# Patient Record
Sex: MALE | Race: WHITE | HISPANIC OR LATINO | Employment: FULL TIME | URBAN - METROPOLITAN AREA
[De-identification: names, ages, dates, MRNs, and addresses within clinical notes are randomized per-mention and may not be internally consistent; named-entity substitution may affect disease eponyms.]

---

## 2018-01-30 ENCOUNTER — OFFICE VISIT (OUTPATIENT)
Dept: FAMILY MEDICINE | Facility: CLINIC | Age: 33
End: 2018-01-30
Payer: COMMERCIAL

## 2018-01-30 VITALS
DIASTOLIC BLOOD PRESSURE: 82 MMHG | WEIGHT: 210.6 LBS | HEART RATE: 92 BPM | SYSTOLIC BLOOD PRESSURE: 131 MMHG | TEMPERATURE: 97.5 F | OXYGEN SATURATION: 98 % | HEIGHT: 68 IN | BODY MASS INDEX: 31.92 KG/M2 | RESPIRATION RATE: 16 BRPM

## 2018-01-30 DIAGNOSIS — G47.30 SLEEP APNEA, UNSPECIFIED TYPE: ICD-10-CM

## 2018-01-30 DIAGNOSIS — Z00.00 ROUTINE HISTORY AND PHYSICAL EXAMINATION OF ADULT: Primary | ICD-10-CM

## 2018-01-30 DIAGNOSIS — Z13.6 CARDIOVASCULAR SCREENING; LDL GOAL LESS THAN 160: ICD-10-CM

## 2018-01-30 DIAGNOSIS — Z13.1 SCREENING FOR DIABETES MELLITUS: ICD-10-CM

## 2018-01-30 LAB
CHOLEST SERPL-MCNC: 183 MG/DL
GLUCOSE SERPL-MCNC: 100 MG/DL (ref 70–99)
HDLC SERPL-MCNC: 45 MG/DL
LDLC SERPL CALC-MCNC: 96 MG/DL
NONHDLC SERPL-MCNC: 138 MG/DL
TRIGL SERPL-MCNC: 208 MG/DL

## 2018-01-30 PROCEDURE — 82947 ASSAY GLUCOSE BLOOD QUANT: CPT | Performed by: FAMILY MEDICINE

## 2018-01-30 PROCEDURE — 36415 COLL VENOUS BLD VENIPUNCTURE: CPT | Performed by: FAMILY MEDICINE

## 2018-01-30 PROCEDURE — 99385 PREV VISIT NEW AGE 18-39: CPT | Performed by: FAMILY MEDICINE

## 2018-01-30 PROCEDURE — 80061 LIPID PANEL: CPT | Performed by: FAMILY MEDICINE

## 2018-01-30 ASSESSMENT — PAIN SCALES - GENERAL: PAINLEVEL: MODERATE PAIN (5)

## 2018-01-30 NOTE — LETTER
2018      Zoran Zack  [address]  [place] [facility] [address]              Dear Zoran Dixon        Your blood sugar and cholesterol tests were okay for you. Your triglycerides mildly elevated. Low fat diet and regular exercise can help lower this. Please follow up in 1 year for routine physical.     Enclosed is a copy of the results.  It was a pleasure to see you at your last appointment.    If you have any questions or concerns, please call myself or my nurse at [phone].      Sincerely,      Pradip Mclean MD/TAMIKA Gomez MA  [employer]      Results for orders placed or performed in visit on 18   Lipid panel reflex to direct LDL Fasting   Result Value Ref Range    Cholesterol 183 <200 mg/dL    Triglycerides 208 (H) <150 mg/dL    HDL Cholesterol 45 >39 mg/dL    LDL Cholesterol Calculated 96 <100 mg/dL    Non HDL Cholesterol 138 (H) <130 mg/dL   Glucose   Result Value Ref Range    Glucose 100 (H) 70 - 99 mg/dL                 RE: Zoran Dixon   MRN: [id number]  : 1985  ENC DATE: 2018

## 2018-01-30 NOTE — MR AVS SNAPSHOT
After Visit Summary   1/30/2018    Zoran Dixon    MRN: [id number]           Patient Information     Date Of Birth          1985        Visit Information        Provider Department      1/30/2018 7:45 AM Pradip Mclean MD; [employer] [facility]        Today's Diagnoses     Routine history and physical examination of adult    -  1    CARDIOVASCULAR SCREENING; LDL GOAL LESS THAN 160        Screening for diabetes mellitus        Sleep apnea, unspecified type          Care Instructions      Preventive Health Recommendations  Male Ages 26 - 39    Yearly exam:             See your health care provider every year in order to  o   Review health changes.   o   Discuss preventive care.    o   Review your medicines if your doctor has prescribed any.    You should be tested each year for STDs (sexually transmitted diseases), if you re at risk.     After age 35, talk to your provider about cholesterol testing. If you are at risk for heart disease, have your cholesterol tested at least every 5 years.     If you are at risk for diabetes, you should have a diabetes test (fasting glucose).  Shots: Get a flu shot each year. Get a tetanus shot every 10 years.     Nutrition:    Eat at least 5 servings of fruits and vegetables daily.     Eat whole-grain bread, whole-wheat pasta and brown rice instead of white grains and rice.     Talk to your provider about Calcium and Vitamin D.     Lifestyle    Exercise for at least 150 minutes a week (30 minutes a day, 5 days a week). This will help you control your weight and prevent disease.     Limit alcohol to one drink per day.     No smoking.     Wear sunscreen to prevent skin cancer.     See your dentist every six months for an exam and cleaning.             Follow-ups after your visit        Additional Services     SLEEP EVALUATION & MANAGEMENT REFERRAL - ADULT -[facility]  [phone] (Age 15 and up)        "Please be aware that coverage of these services is subject to the terms and limitations of your health insurance plan.  Call member services at your health plan with any benefit or coverage questions.      Please bring the following to your appointment:    >>   List of current medications   >>   This referral request   >>   Any documents/labs given to you for this referral                      Future tests that were ordered for you today     Open Future Orders        Priority Expected Expires Ordered    SLEEP EVALUATION & MANAGEMENT REFERRAL - ADULT -[facility] [place]  [phone] (Age 15 and up) Routine  1/30/2019 1/30/2018            Who to contact     If you have questions or need follow up information about today's clinic visit or your schedule please contact [facility] directly at [phone].  Normal or non-critical lab and imaging results will be communicated to you by MyChart, letter or phone within 4 business days after the clinic has received the results. If you do not hear from us within 7 days, please contact the clinic through MyChart or phone. If you have a critical or abnormal lab result, we will notify you by phone as soon as possible.  Submit refill requests through [employer] or call your pharmacy and they will forward the refill request to us. Please allow 3 business days for your refill to be completed.          Additional Information About Your Visit        [employer] Information     [employer] lets you send messages to your doctor, view your test results, renew your prescriptions, schedule appointments and more. To sign up, go to [email or web address] . Click on \"Log in\" on the left side of the screen, which will take you to the Welcome page. Then click on \"Sign up Now\" on the right side of the page.     You will be asked to enter the access code listed below, as well as some personal information. Please follow the directions to create your username and " "password.     Your access code is: [email or web address]  Expires: 2018  8:20 AM     Your access code will  in 90 days. If you need help or a new code, please call your [facility] clinic or [phone].        Care EveryWhere ID     This is your Care EveryWhere ID. This could be used by other organizations to access your [facility] medical records  [id number]        Your Vitals Were     Pulse Temperature Respirations Height Pulse Oximetry BMI (Body Mass Index)    92 97.5  F (36.4  C) (Oral) 16 5' 8\" (1.727 m) 98% 32.02 kg/m2       Blood Pressure from Last 3 Encounters:   18 131/82    Weight from Last 3 Encounters:   18 210 lb 9.6 oz (95.5 kg)              We Performed the Following     Glucose     Lipid panel reflex to direct LDL Fasting        Primary Care Provider Fax #    Provider Not In System [phone]                Equal Access to Services     VINAY STRAUSS : Hadii alicia ku hadasho Soomaali, waaxda luqadaha, qaybta kaalmada adeegyada, jeannie gregg . So [facility] [phone].    ATENCIÓN: Si habla español, tiene a prado disposición servicios gratuitos de asistencia lingüística. Llame al [phone].    We comply with applicable federal civil rights laws and [place] laws. We do not discriminate on the basis of race, color, national origin, age, disability, sex, sexual orientation, or gender identity.            Thank you!     Thank you for choosing [facility]  for your care. Our goal is always to provide you with excellent care. Hearing back from our patients is one way we can continue to improve our services. Please take a few minutes to complete the written survey that you may receive in the mail after your visit with us. Thank you!             Your Updated Medication List - Protect others around you: Learn how to safely use, store and throw away your medicines at www.disposemymeds.org.      Notice  As of 2018  8:20 AM    You have not been " prescribed any medications.

## 2018-01-30 NOTE — PROGRESS NOTES
SUBJECTIVE:   CC: Zoran Dixon is an 32 year old male who presents for preventative health visit.     Healthy Habits:    Do you get at least three servings of calcium containing foods daily (dairy, green leafy vegetables, etc.)? yes    Amount of exercise or daily activities, outside of work: None    Problems taking medications regularly No    Medication side effects: No    Have you had an eye exam in the past two years? no    Do you see a dentist twice per year? no    Do you have sleep apnea, excessive snoring or daytime drowsiness?yes - pt did have a sleep study before -- wanting another one       Today's PHQ-2 Score:   PHQ-2 ( 1999 [employer]) 1/30/2018   Q1: Little interest or pleasure in doing things 0   Q2: Feeling down, depressed or hopeless 0   PHQ-2 Score 0       Abuse: Current or Past(Physical, Sexual or Emotional)- No  Do you feel safe in your environment - Yes    Social History   Substance Use Topics     Smoking status: Never Smoker     Smokeless tobacco: Never Used     Alcohol use Yes      If you drink alcohol do you typically have >3 drinks per day or >7 drinks per week? No                      Last PSA: No results found for: PSA    Reviewed orders with patient. Reviewed health maintenance and updated orders accordingly - Yes  Labs reviewed in EPIC    Reviewed and updated as needed this visit by clinical staff  Tobacco  Allergies  Meds  Med Hx  Surg Hx  Fam Hx  Soc Hx        Reviewed and updated as needed this visit by Provider            ROS:  C: NEGATIVE for fever, chills, change in weight  I: NEGATIVE for worrisome rashes, moles or lesions  E: NEGATIVE for vision changes or irritation  ENT: NEGATIVE for ear, mouth and throat problems  R: NEGATIVE for significant cough or SOB  CV: NEGATIVE for chest pain, palpitations or peripheral edema  GI: NEGATIVE for nausea, abdominal pain, heartburn, or change in bowel habits   male: negative for dysuria, hematuria, decreased urinary stream, erectile  "dysfunction, urethral discharge  M: NEGATIVE for significant arthralgias or myalgia  N: NEGATIVE for weakness, dizziness or paresthesias  P: NEGATIVE for changes in mood or affect    OBJECTIVE:   /82 (BP Location: Left arm, Patient Position: Sitting, Cuff Size: Adult Regular)  Pulse 92  Temp 97.5  F (36.4  C) (Oral)  Resp 16  Ht 5' 8\" (1.727 m)  Wt 210 lb 9.6 oz (95.5 kg)  SpO2 98%  BMI 32.02 kg/m2  EXAM:  GENERAL: healthy, alert and no distress  NECK: no adenopathy, no asymmetry, masses, or scars and thyroid normal to palpation  RESP: lungs clear to auscultation - no rales, rhonchi or wheezes  CV: regular rate and rhythm, normal S1 S2, no S3 or S4, no murmur, click or rub, no peripheral edema and peripheral pulses strong  ABDOMEN: soft, nontender, no hepatosplenomegaly, no masses and bowel sounds normal  MS: no gross musculoskeletal defects noted, no edema    ASSESSMENT/PLAN:   1. Routine history and physical examination of adult  As below.    2. CARDIOVASCULAR SCREENING; LDL GOAL LESS THAN 160    - Lipid panel reflex to direct LDL Fasting    3. Screening for diabetes mellitus  - Glucose    4. Sleep apnea, unspecified type  Discussed weight loss.  - SLEEP EVALUATION & MANAGEMENT REFERRAL - ADULT -[facility] [place]  [phone] (Age 15 and up); Future    COUNSELING:  Reviewed preventive health counseling, as reflected in patient instructions       Regular exercise       Healthy diet/nutrition       Vision screening       reports that he has never smoked. He has never used smokeless tobacco.    Estimated body mass index is 32.02 kg/(m^2) as calculated from the following:    Height as of this encounter: 5' 8\" (1.727 m).    Weight as of this encounter: 210 lb 9.6 oz (95.5 kg).       Counseling Resources:  ATP IV Guidelines  Pooled Cohorts Equation Calculator  FRAX Risk Assessment  ICSI Preventive Guidelines  Dietary Guidelines for Americans, 2010  USDA's MyPlate  ASA Prophylaxis  Lung " CA Screening    Pradip Mclean MD, MD  [facility]

## 2018-01-30 NOTE — NURSING NOTE
"Chief Complaint   Patient presents with     Physical     pt is fasting       Initial /82 (BP Location: Left arm, Patient Position: Sitting, Cuff Size: Adult Regular)  Pulse 92  Temp 97.5  F (36.4  C) (Oral)  Resp 16  Ht 5' 8\" (1.727 m)  Wt 210 lb 9.6 oz (95.5 kg)  SpO2 98%  BMI 32.02 kg/m2 Estimated body mass index is 32.02 kg/(m^2) as calculated from the following:    Height as of this encounter: 5' 8\" (1.727 m).    Weight as of this encounter: 210 lb 9.6 oz (95.5 kg).  Medication Reconciliation: complete     Will Hunter LAND      "

## 2018-02-07 ENCOUNTER — OFFICE VISIT (OUTPATIENT)
Dept: SLEEP MEDICINE | Facility: CLINIC | Age: 33
End: 2018-02-07
Attending: FAMILY MEDICINE
Payer: COMMERCIAL

## 2018-02-07 VITALS — BODY MASS INDEX: 32.96 KG/M2 | HEART RATE: 90 BPM | OXYGEN SATURATION: 95 % | HEIGHT: 67 IN | WEIGHT: 210 LBS

## 2018-02-07 DIAGNOSIS — F10.10 ALCOHOL CONSUMPTION BINGE DRINKING: ICD-10-CM

## 2018-02-07 DIAGNOSIS — G47.33 OSA (OBSTRUCTIVE SLEEP APNEA): ICD-10-CM

## 2018-02-07 DIAGNOSIS — E66.811 OBESITY (BMI 30.0-34.9): Primary | ICD-10-CM

## 2018-02-07 DIAGNOSIS — J35.1 TONSILLAR HYPERTROPHY: ICD-10-CM

## 2018-02-07 PROCEDURE — 99244 OFF/OP CNSLTJ NEW/EST MOD 40: CPT | Performed by: INTERNAL MEDICINE

## 2018-02-07 NOTE — PATIENT INSTRUCTIONS

## 2018-02-07 NOTE — PROGRESS NOTES
Sleep Consultation:    Date on this visit: 2/7/2018    Zoran Dixon is sent by Pradip Mclean for a sleep consultation regarding snoring and possible sleep apnea.    Primary Physician: System, Provider Not In     Due to language barrier, an  was present during the history-taking and subsequent discussion (and for part of the physical exam) with this patient (Ranjit, Tristanian-).    He has a history of obesity.    Schedule - Does not set alarm.  Wakes around 8 - 9 AM but wants to sleep more (wife wakes with the kids so he sleeps in a bit).  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  Gets home around 11:30 and into bed around 12 - 12:15 and will play on phone or phone.  Usually has trouble falling asleep until around 1 - 1:10 AM.   On the weekends will keep the same schedule.    Sleep Disordered Breathing - Snoring is loud and audible in the living room.  Does have witnessed apneas and snort arousals. During the day has trouble breathing through his nose.   Has nocturia twice per night.  No nocturnal GERD.   Upon waking feels sleepy.  He reports morning headaches.  Does get morning dry mouth and chronic sinus congestion.   Patient was counseled on the importance of driving while alert, to pull over if drowsy, or nap before getting into the vehicle if sleepy.    Movement/Behaviors - Occasional somniloquy.  No somnambulism.  No sleep related eating.  No dream enactment behavior.   Patient reports rare typical restless legs syndrome symptoms (maybe once or twice per month)   He denies bruxism.     Alcohol use - Prefers beer; drinks on Saturday and Sunday; will have up to 8 beers in a day.    Caffeine intake - ~1 cups/day of coffee. Last caffeine intake is usually in the morning.  Tobacco exposure - None  Illicit substances - None    Lives with wife and children (3d, 6s, 7d).  Has no pets.     Does have family history of snoring in 6-year-old son.  "    Allergies:    No Known Allergies    Medications:    No current outpatient prescriptions on file.       Problem List:  Patient Active Problem List    Diagnosis Date Noted     Obesity (BMI 30.0-34.9) 02/07/2018     Priority: Medium     Alcohol consumption binge drinking 02/07/2018     Priority: Medium     CARDIOVASCULAR SCREENING; LDL GOAL LESS THAN 160 01/30/2018     Priority: Medium     Pain in joint, lower leg 03/08/2013     Priority: Medium        Past Medical/Surgical History:  No past medical history on file.  No past surgical history on file.    Social History:  Social History     Social History     Marital status:      Spouse name: N/A     Number of children: N/A     Years of education: N/A     Occupational History     Not on file.     Social History Main Topics     Smoking status: Current Every Day Smoker     Types: Cigarettes     Smokeless tobacco: Never Used     Alcohol use Yes      Comment: occasionally     Drug use: No     Sexual activity: Yes     Partners: Female     Other Topics Concern     Not on file     Social History Narrative       Family History:  See HPI.    Review of Systems:  A complete review of systems reviewed by me is negative with the exeption of what has been mentioned in the history of present illness.  Shortness of breath; Inspiratory respiratory difficulty.    Physical Examination:  Vitals: Pulse 90  Ht 1.702 m (5' 7\")  Wt 95.3 kg (210 lb)  SpO2 95%  BMI 32.89 kg/m2  BMI= Body mass index is 32.89 kg/(m^2).    [place] Total Score 2/7/2018   Total score - [place] 1     General appearance: No apparent distress, well groomed.    HEENT:   Head: Normocephalic, atraumatic.  Eyes: PERRL  Nose: Nares patent.  No exudate.  External deviation with right narrowing.  Mouth: Teeth: Normal   Tongue: Normal  Oropharynx:  Mallampati Classification: IV    Tonsils: Kissing/Grade IV    Uvula: Enlarged    Neck: Supple without bruit.     Neck Cir (cm): 43 cm (2/7/2018  8:00 AM)    Cardiac: " Regular rate and rhythm.  No murmurs.  Radial pulses are strong and symmetric.  Pulmonary: Symmetric air movement, lungs clear to auscultation bilaterally.  Musculoskeletal: No edema noted.  No clubbing or cyanosis.  Skin: Warm, dry, intact.  Neurologic: Alert and oriented to person, place and time   Gait is normal.  Psychiatric: Affect pleasant.  Mood good.     Impression/Plan:  1. BENJI (obstructive sleep apnea)  I have a high suspicion for BENJI based on presence of snoring, snort arousals, witnessed apneas, enlarged neck girth >= 43 cm for male, and obesity with excessive daytime sleepiness. We discussed pathophysiology of obstructive sleep apnea, testing with overnight polysomnography, and treatment modalities (CPAP only discussed at this visit). We discussed consequences of untreated BENJI. Patient is interested in treatment and willing to undergo overnight sleep testing.    Home sleep testing is appropriate for this patient.  Study has been ordered today.      - SLEEP EVALUATION & MANAGEMENT REFERRAL - [facility] [place]  [phone] (Age 15 and up)  - HST-Home Sleep Apnea Test; Future    2. Obesity (BMI 30.0-34.9)  We discussed the link between obesity, sleep apnea, and health outcomes.  Patient was encouraged to decrease caloric intake and increase activity levels to try to move towards a normal weight.  He was encouraged to discuss further strategies with his primary care provider.     3. Alcohol consumption binge drinking  We discussed the link between alcohol and Obstructive Sleep Apnea, through multiple mechanisms, including upper airway relaxation, sleep fragmentation, and indirectly through weight gain.  He was encouraged to consider reduction of alcohol consumption and discuss further with primary care provider.   We also discussed the effects of binge drinking on weight and counseled on caloric restriction through alcohol consumption tapering.    4. Tonsillar  hypertrophy  Although it is unlikely that addressing tonsillar hypertrophy would resolve snoring and sleep apnea, patient is complaining of daytime respiratory restriction and has significant airflow limitation at least in part due to size and position of tonsils.    - Referral to ENT     Literature provided regarding sleep apnea.      He will follow up with me in approximately two weeks after his sleep study has been competed to review the results and discuss plan of care.       Polysomnography reviewed.  Obstructive sleep apnea reviewed.  Complications of untreated sleep apnea were reviewed.    Jon Landry MD, Sleep Physician  Feb 7, 2018     CC: Pradip Mclean

## 2018-02-07 NOTE — MR AVS SNAPSHOT
After Visit Summary   2/7/2018    Zoran Dixon    MRN: [id number]           Patient Information     Date Of Birth          1985        Visit Information        Provider Department      2/7/2018 8:30 AM Jon Landry MD; [employer] [facility]        Today's Diagnoses     Obesity (BMI 30.0-34.9)    -  1    BENJI (obstructive sleep apnea)        Alcohol consumption binge drinking        Tonsillar hypertrophy          Care Instructions      Your BMI is Body mass index is 32.89 kg/(m^2).  Weight management is a personal decision.  If you are interested in exploring weight loss strategies, the following discussion covers the approaches that may be successful. Body mass index (BMI) is one way to tell whether you are at a healthy weight, overweight, or obese. It measures your weight in relation to your height.  A BMI of 18.5 to 24.9 is in the healthy range. A person with a BMI of 25 to 29.9 is considered overweight, and someone with a BMI of 30 or greater is considered obese. More than two-thirds of American adults are considered overweight or obese.  Being overweight or obese increases the risk for further weight gain. Excess weight may lead to heart disease and diabetes.  Creating and following plans for healthy eating and physical activity may help you improve your health.  Weight control is part of healthy lifestyle and includes exercise, emotional health, and healthy eating habits. Careful eating habits lifelong are the mainstay of weight control. Though there are significant health benefits from weight loss, long-term weight loss with diet alone may be very difficult to achieve- studies show long-term success with dietary management in less than 10% of people. Attaining a healthy weight may be especially difficult to achieve in those with severe obesity. In some cases, medications, devices and surgical management might be considered.  What can you  do?  If you are overweight or obese and are interested in methods for weight loss, you should discuss this with your provider.     Consider reducing daily calorie intake by 500 calories.     Keep a food journal.     Avoiding skipping meals, consider cutting portions instead.    Diet combined with exercise helps maintain muscle while optimizing fat loss. Strength training is particularly important for building and maintaining muscle mass. Exercise helps reduce stress, increase energy, and improves fitness. Increasing exercise without diet control, however, may not burn enough calories to loose weight.       Start walking three days a week 10-20 minutes at a time    Work towards walking thirty minutes five days a week     Eventually, increase the speed of your walking for 1-2 minutes at time    In addition, we recommend that you review healthy lifestyles and methods for weight loss available through the National Institutes of Health patient information sites:  http://win.niddk.nih.gov/publications/index.htm    And look into health and wellness programs that may be available through your health insurance provider, employer, local community center, or chanel club.    Weight management plan: Patient was referred to their PCP to discuss a diet and exercise plan.              Follow-ups after your visit        Additional Services     Referral to ENT       Kissing/Grade IV tonsils with daytime exertional inspiratory difficulty.                  Your next 10 appointments already scheduled     Feb 20, 2018  9:45 AM CST   New Visit with Jesse Bashir MD   [facility]    [address] [place] [address]   [phone]            Feb 26, 2018 10:00 AM CST   HST  with BK BED 5   [facility] ([facility])    [address] [place] [address]   [phone]            Feb 27,  "2018  1:30 PM CST   HST Drop Off with PETRA SC DME   [facility] ([facility])    [address]  [facility] [address]   [phone]            Feb 27, 2018  4:00 PM CST   Return Sleep Patient with Jon Landry MD   [facility] ([facility])    [address]  [facility] [address]   [phone]              Future tests that were ordered for you today     Open Future Orders        Priority Expected Expires Ordered    HST-Home Sleep Apnea Test Routine  8/9/2018 2/7/2018            Who to contact     If you have questions or need follow up information about today's clinic visit or your schedule please contact [facility] directly at [phone].  Normal or non-critical lab and imaging results will be communicated to you by MyChart, letter or phone within 4 business days after the clinic has received the results. If you do not hear from us within 7 days, please contact the clinic through [employer]hart or phone. If you have a critical or abnormal lab result, we will notify you by phone as soon as possible.  Submit refill requests through [employer] or call your pharmacy and they will forward the refill request to us. Please allow 3 business days for your refill to be completed.          Additional Information About Your Visit        [employer] Information     [employer] lets you send messages to your doctor, view your test results, renew your prescriptions, schedule appointments and more. To sign up, go to [email or web address] . Click on \"Log in\" on the left side of the screen, which will take you to the Welcome page. Then click on \"Sign up Now\" on the right side of the page.     You will be asked to enter the access code listed below, as well as some personal information. Please follow the directions to create your username and password.     Your access code is: " [email or web address]  Expires: 2018  8:20 AM     Your access code will  in 90 days. If you need help or a new code, please call your [place] clinic or [phone].        Care EveryWhere ID     This is your Care EveryWhere ID. This could be used by other organizations to access your [place] medical records  [id number]        Your Vitals Were     Pulse Height Pulse Oximetry BMI (Body Mass Index)          90 1.702 m (5' 7\") 95% 32.89 kg/m2         Blood Pressure from Last 3 Encounters:   18 131/82    Weight from Last 3 Encounters:   18 95.3 kg (210 lb)   18 95.5 kg (210 lb 9.6 oz)              We Performed the Following     Referral to ENT     SLEEP EVALUATION & MANAGEMENT REFERRAL - ADULT -[facility]  [phone] (Age 15 and up)        Primary Care Provider Fax #    Provider Not In System [phone]                Equal Access to Services     CHERI STRAUSS : Hadii aad ku hadasho Soomaali, waaxda luqadaha, qaybta kaalmada adeegyada, waxay mollyin haycortney gregg . So [facility] [phone].    ATENCIÓN: Si habla español, tiene a prado disposición servicios gratuitos de asistencia lingüística. Llame al [phone].    We comply with applicable federal civil rights laws and [place] laws. We do not discriminate on the basis of race, color, national origin, age, disability, sex, sexual orientation, or gender identity.            Thank you!     Thank you for choosing [facility]  for your care. Our goal is always to provide you with excellent care. Hearing back from our patients is one way we can continue to improve our services. Please take a few minutes to complete the written survey that you may receive in the mail after your visit with us. Thank you!             Your Updated Medication List - Protect others around you: Learn how to safely use, store and throw away your medicines at www.disposemymeds.org.      Notice  As of 2018  9:41 AM    " You have not been prescribed any medications.

## 2018-02-07 NOTE — NURSING NOTE
"Chief Complaint   Patient presents with     Sleep Problem     cant sleep stops breathing       Initial Pulse 90  Ht 1.702 m (5' 7\")  Wt 95.3 kg (210 lb)  SpO2 95%  BMI 32.89 kg/m2 Estimated body mass index is 32.89 kg/(m^2) as calculated from the following:    Height as of this encounter: 1.702 m (5' 7\").    Weight as of this encounter: 95.3 kg (210 lb).  Medication Reconciliation: complete   Neck circumference: 17.5 inches.      "

## 2018-02-19 ENCOUNTER — OFFICE VISIT (OUTPATIENT)
Dept: FAMILY MEDICINE | Facility: CLINIC | Age: 33
End: 2018-02-19
Payer: COMMERCIAL

## 2018-02-19 VITALS
TEMPERATURE: 98 F | SYSTOLIC BLOOD PRESSURE: 130 MMHG | DIASTOLIC BLOOD PRESSURE: 89 MMHG | RESPIRATION RATE: 16 BRPM | WEIGHT: 215 LBS | HEART RATE: 72 BPM | OXYGEN SATURATION: 98 % | HEIGHT: 67 IN | BODY MASS INDEX: 33.74 KG/M2

## 2018-02-19 DIAGNOSIS — B34.9 VIRAL ILLNESS: Primary | ICD-10-CM

## 2018-02-19 PROCEDURE — 99213 OFFICE O/P EST LOW 20 MIN: CPT | Performed by: FAMILY MEDICINE

## 2018-02-19 RX ORDER — BENZONATATE 200 MG/1
200 CAPSULE ORAL 3 TIMES DAILY PRN
Qty: 21 CAPSULE | Refills: 0 | Status: SHIPPED | OUTPATIENT
Start: 2018-02-19 | End: 2019-02-14

## 2018-02-19 NOTE — MR AVS SNAPSHOT
After Visit Summary   2/19/2018    Zoran Dixon    MRN: [id number]           Patient Information     Date Of Birth          1985        Visit Information        Provider Department      2/19/2018 4:15 PM Mima Kuhn MD; [employer] [facility]        Today's Diagnoses     Viral illness    -  1      Care Instructions    At [facility], we strive to deliver an exceptional experience to you, every time we see you.  If you receive a survey in the mail, please send us back your thoughts. We really do value your feedback.    Based on your medical history, these are the current health maintenance/preventive care services that you are due for (some may have been done at this visit.)  Health Maintenance Due   Topic Date Due     INFLUENZA VACCINE (SYSTEM ASSIGNED)  09/01/2017         Suggested websites for health information:  Www.[employer] Yield : Up to date and easily searchable information on multiple topics.  Www.[place].gov : medication info, interactive tutorials, watch real surgeries online  Www.familydoctor.org : good info from the Academy of Family Physicians  Www.cdc.gov : public health info, travel advisories, epidemics (H1N1)  Www.aap.org : children's health info, normal development, vaccinations  Www.health.[facility].[place].us : [place] dept of health, public health issues in [place], N1N1    Your care team:                            Family Medicine Internal Medicine   MD Landon Flanagan MD Shantel Branch-Fleming, MD Katya Georgiev PA-C Megan Hill, APRN PAOLA Mclean MD Pediatrics   KANU Beckham, PAOLA Dye APRN CNP   MD Mary Blood MD Deborah Mielke, MD Kim Thein, APRN CNP      Clinic hours: Monday - Thursday 7 am-7 pm; Fridays 7 am-5 pm.   Urgent care: Monday - Friday 11 am-9 pm; Saturday and Sunday 9 am-5 pm.  Pharmacy : Monday -Thursday 8 am-8 pm; Friday  8 am-6 pm; Saturday and Sunday 9 am-5 pm.     Clinic: [phone]   Pharmacy: [phone]      Síndrome Viral [Viral Syndrome: Adult]  Paddy enfermedad viral puede ocasionar diferentes síntomas que dependen de cuál sea la parte de prado cuerpo afectada por el virus. Si el virus se aloja en la nariz, la garganta o los pulmones, puede provocar tos, dolor de garganta, congestión y, en ocasiones, dolor de rosa. Si se aloja en el estómago o el tracto intestinal, puede provocar vómitos y diarrea. En ocasiones, puede causar síntomas vagos, tales xavier dolor generalizado, sensación de cansancio, pérdida de apetito o fiebre.  Paddy enfermedad viral suele durar entre paddy y dos semanas; kem vez un poco más. En algunos casos, paddy infección más grave puede verse xavier un síndrome viral nancy los primeros días de la enfermedad. Puede que deban hacerle otro examen y análisis adicionales para saber de cuál de los dos casos se trata. Es importante que preste atención a los signos que se describen a continuación.  Cuidados en la casa    Si los síntomas son graves, descanse en prado casa nancy los primeros dos o sandra chidi.    Aléjese del humo del cigarrillo, tanto prado propio humo xavier el humo de otras personas.    Puede usar acetaminofén o ibuprofeno para controlar la fiebre, el dolor muscular y el dolor de rosa, a menos que le hayan recetado otro medicamento para esto. Si tiene paddy enfermedad renal o hepática crónica o alguna vez tuvo úlcera estomacal o sangrado gastrointestinal, hable con prado médico antes de usar estos medicamentos. Ninguna persona anna de 18 años que esté enferma con fiebre debería starr aspirina porque puede provocarle graves daños en el hígado.    Es posible que tenga poco apetito, por lo que paddy dieta ligera está raeann. Para evitar la deshidratación, saud entre 8 y 12 vasos de ocho onzas (250 cm3) de líquido cada día. Puede ser agua, jugo de naranja y limonada, jugo de manzana, uva y arándano, jugos de frutas  althea, reemplazo de electrolitos y bebidas deportivas, café y tés descafeinados. Si le diagnosticaron paddy enfermedad renal, pregunte a prado médico cuánto y qué tipos de líquidos debería beber para prevenir la deshidratación. Si tiene paddy enfermedad renal, beber demasiada cantidad de líquidos puede hacer que se acumule en el cuerpo, lo que puede ser peligroso para prado pasquale.     Los medicamentos de venta madhavi no reducirán la duración de la enfermedad, farrah pueden ser útiles para aliviar la tos, el dolor de garganta, y la congestión nasal y paranasal. No use descongestionantes si tiene la presión arterial ramila.  Visitas de control  Tracy paddy visita de control a prado proveedor de atención médica si no empieza a sentirse mejor nancy la semana próxima.  Cuándo debe buscar atención médica  Obtenga atención médica de inmediato si algo de lo siguiente ocurre:    Tos con mucho esputo (mucosidad) de color o con nancy.    Dolor en el pecho, falta de aire, silbidos o dificultad para respirar.    Dolor de rosa kecia, dolor en la alicia, el gail o los oídos.    Dolor kecia y tracey en la parte inferior derecha de prado abdomen.    Vómito persistente (no puede mantener los líquidos en el estómago).    Diarrea frecuente (más de 5 veces al día); nancy (de color rojizo o negruzco) o mucosidad en la diarrea.    Se siente débil, mareado o a punto de desmayarse.    Mucha sed.    Fiebre de 100.4 F (38 C) [oral] o más, que no mejora con los medicamentos para la fiebre.    Convulsiones.   Date Last Reviewed: 9/25/2015 2000-2017 [employer]. [address] [place] [address], [place] [address]. Todos los derechos reservados. Esta información no pretende sustituir la atención médica profesional. Sólo prado médico puede diagnosticar y tratar un problema de pasquale.                Follow-ups after your visit        Your next 10 appointments already scheduled     Feb 20, 2018  9:45 AM CST   New Visit with Jesse Bashir MD   [place]  [facility] ([facility])    [address] [facility] [address]   [phone]            Feb 26, 2018 10:00 AM CST   HST  with BK BED 5   [facility] ([facility])    [address]  [facility] [address]   [phone]            Feb 27, 2018  1:30 PM CST   HST Drop Off with BK SC DME   [facility] ([facility])    [address]  [facility] [address]   [phone]            Feb 27, 2018  4:00 PM CST   Return Sleep Patient with Jon Landry MD   [facility] ([facility])    [address] [address]   [phone]              Who to contact     If you have questions or need follow up information about today's clinic visit or your schedule please contact [facility] directly at [phone].  Normal or non-critical lab and imaging results will be communicated to you by MyChart, letter or phone within 4 business days after the clinic has received the results. If you do not hear from us within 7 days, please contact the clinic through MyChart or phone. If you have a critical or abnormal lab result, we will notify you by phone as soon as possible.  Submit refill requests through [employer] or call your pharmacy and they will forward the refill request to us. Please allow 3 business days for your refill to be completed.          Additional Information About Your Visit        [employer] Information     [employer] lets you send messages to your doctor, view your test results, renew your prescriptions, schedule appointments and more. To sign up, go to [email or web address] . Click on \"Log in\" on the left side of the screen, which will take you to the Welcome page. Then click on \"Sign up Now\" on the right side of the page.     You " "will be asked to enter the access code listed below, as well as some personal information. Please follow the directions to create your username and password.     Your access code is: [email or web address]  Expires: 2018  8:20 AM     Your access code will  in 90 days. If you need help or a new code, please call your [place] clinic or [phone].        Care EveryWhere ID     This is your Care EveryWhere ID. This could be used by other organizations to access your [place] medical records  [id number]        Your Vitals Were     Pulse Temperature Respirations Height Pulse Oximetry BMI (Body Mass Index)    72 98  F (36.7  C) 16 5' 7\" (1.702 m) 98% 33.67 kg/m2       Blood Pressure from Last 3 Encounters:   18 130/89   18 131/82    Weight from Last 3 Encounters:   18 215 lb (97.5 kg)   18 210 lb (95.3 kg)   18 210 lb 9.6 oz (95.5 kg)              Today, you had the following     No orders found for display         Today's Medication Changes          These changes are accurate as of 18  4:34 PM.  If you have any questions, ask your nurse or doctor.               Start taking these medicines.        Dose/Directions    benzonatate 200 MG capsule   Commonly known as:  TESSALON   Used for:  Viral illness   Started by:  Mima Kuhn MD        Dose:  200 mg   Take 1 capsule (200 mg) by mouth 3 times daily as needed for cough   Quantity:  21 capsule   Refills:  0            Where to get your medicines      [id number] [address] [facility]  [address] [address]    Hours:  24-hours Phone:  [phone]     benzonatate 200 MG capsule                Primary Care Provider Fax #    Provider Not In System [phone]                Equal Access to Services     CHERI STRAUSS AH: Hadii alicia armenta Soaysha, waaxda luqadaha, qaybta kaalmada wardda, jeannie " butch sorianotyra la'aan ah. So [facility] [phone].    ATENCIÓN: Si habla ailyn, tiene a prado disposición servicios gratuitos de asistencia lingüística. Casa al [phone].    We comply with applicable federal civil rights laws and [place] laws. We do not discriminate on the basis of race, color, national origin, age, disability, sex, sexual orientation, or gender identity.            Thank you!     Thank you for choosing [facility]  for your care. Our goal is always to provide you with excellent care. Hearing back from our patients is one way we can continue to improve our services. Please take a few minutes to complete the written survey that you may receive in the mail after your visit with us. Thank you!             Your Updated Medication List - Protect others around you: Learn how to safely use, store and throw away your medicines at www.disposemymeds.org.          This list is accurate as of 2/19/18  4:34 PM.  Always use your most recent med list.                   Brand Name Dispense Instructions for use Diagnosis    benzonatate 200 MG capsule    TESSALON    21 capsule    Take 1 capsule (200 mg) by mouth 3 times daily as needed for cough    Viral illness

## 2018-02-19 NOTE — PATIENT INSTRUCTIONS
At [facility], we strive to deliver an exceptional experience to you, every time we see you.  If you receive a survey in the mail, please send us back your thoughts. We really do value your feedback.    Based on your medical history, these are the current health maintenance/preventive care services that you are due for (some may have been done at this visit.)  Health Maintenance Due   Topic Date Due     INFLUENZA VACCINE (SYSTEM ASSIGNED)  09/01/2017         Suggested websites for health information:  Www.[employer].Health Warrior : Up to date and easily searchable information on multiple topics.  Www.[employer].gov : medication info, interactive tutorials, watch real surgeries online  Www.familydoctor.org : good info from the Academy of Family Physicians  Www.cdc.gov : public health info, travel advisories, epidemics (H1N1)  Www.aap.org : children's health info, normal development, vaccinations  Www.health.[facility].[place].us : [place] dept of health, public health issues in [place], N1N1    Your care team:                            Family Medicine Internal Medicine   MD Landon Flanagan MD Shantel Branch-Fleming, MD Katya Georgiev PA-C Megan Hill, APRN CNP    Pradip Mclean MD Pediatrics   Ethan Burgos, PAAdyC  Maggy Singh, CNP Natalie Dye APRN CNP   MD Mary Blood MD Deborah Mielke, MD Kim Thein, APRN [facility]      Clinic hours: Monday - Thursday 7 am-7 pm; Fridays 7 am-5 pm.   Urgent care: Monday - Friday 11 am-9 pm; Saturday and Sunday 9 am-5 pm.  Pharmacy : Monday -Thursday 8 am-8 pm; Friday 8 am-6 pm; Saturday and Sunday 9 am-5 pm.     Clinic: [phone]   Pharmacy: [phone]      Síndrome Viral [Viral Syndrome: Adult]  Adriane enfermedad viral puede ocasionar diferentes síntomas que dependen de cuál sea la parte de prado cuerpo afectada por el virus. Si el virus se aloja en la nariz, la garganta o los pulmones, puede provocar tos, dolor de garganta, congestión y, en ocasiones,  dolor de rosa. Si se aloja en el estómago o el tracto intestinal, puede provocar vómitos y diarrea. En ocasiones, puede causar síntomas vagos, tales xavier dolor generalizado, sensación de cansancio, pérdida de apetito o fiebre.  Paddy enfermedad viral suele durar entre paddy y dos semanas; kem vez un poco más. En algunos casos, paddy infección más grave puede verse xavier un síndrome viral nancy los primeros días de la enfermedad. Puede que deban hacerle otro examen y análisis adicionales para saber de cuál de los dos casos se trata. Es importante que preste atención a los signos que se describen a continuación.  Cuidados en la casa    Si los síntomas son graves, descanse en prado casa nancy los primeros dos o sandra chidi.    Aléjese del humo del cigarrillo, tanto prado propio humo xavier el humo de otras personas.    Puede usar acetaminofén o ibuprofeno para controlar la fiebre, el dolor muscular y el dolor de rosa, a menos que le hayan recetado otro medicamento para esto. Si tiene paddy enfermedad renal o hepática crónica o alguna vez tuvo úlcera estomacal o sangrado gastrointestinal, hable con prado médico antes de usar estos medicamentos. Ninguna persona anna de 18 años que esté enferma con fiebre debería starr aspirina porque puede provocarle graves daños en el hígado.    Es posible que tenga poco apetito, por lo que paddy dieta ligera está raeann. Para evitar la deshidratación, saud entre 8 y 12 vasos de ocho onzas (250 cm3) de líquido cada día. Puede ser agua, jugo de naranja y limonada, jugo de manzana, uva y arándano, jugos de frutas althea, reemplazo de electrolitos y bebidas deportivas, café y tés descafeinados. Si le diagnosticaron paddy enfermedad renal, pregunte a prado médico cuánto y qué tipos de líquidos debería beber para prevenir la deshidratación. Si tiene paddy enfermedad renal, beber demasiada cantidad de líquidos puede hacer que se acumule en el cuerpo, lo que puede ser peligroso para prado pasquale.     Los medicamentos de  venta madhavi no reducirán la duración de la enfermedad, farrah pueden ser útiles para aliviar la tos, el dolor de garganta, y la congestión nasal y paranasal. No use descongestionantes si tiene la presión arterial ramila.  Visitas de control  Tracy paddy visita de control a prado proveedor de atención médica si no empieza a sentirse mejor nancy la semana próxima.  Cuándo debe buscar atención médica  Obtenga atención médica de inmediato si algo de lo siguiente ocurre:    Tos con mucho esputo (mucosidad) de color o con nancy.    Dolor en el pecho, falta de aire, silbidos o dificultad para respirar.    Dolor de rosa kecia, dolor en la alicia, el gail o los oídos.    Dolor kecia y tracey en la parte inferior derecha de prado abdomen.    Vómito persistente (no puede mantener los líquidos en el estómago).    Diarrea frecuente (más de 5 veces al día); nancy (de color rojizo o negruzco) o mucosidad en la diarrea.    Se siente débil, mareado o a punto de desmayarse.    Mucha sed.    Fiebre de 100.4 F (38 C) [oral] o más, que no mejora con los medicamentos para la fiebre.    Convulsiones.   Date Last Reviewed: 9/25/2015 2000-2017 [employer]. [address] [place] [address], [place] [address]. Todos los derechos reservados. Esta información no pretende sustituir la atención médica profesional. Sólo prado médico puede diagnosticar y tratar un problema de pasquale.

## 2018-02-19 NOTE — NURSING NOTE
"Chief Complaint   Patient presents with     Cough       Initial /89  Pulse 72  Temp 98  F (36.7  C)  Resp 16  Ht 5' 7\" (1.702 m)  Wt 215 lb (97.5 kg)  SpO2 98%  BMI 33.67 kg/m2 Estimated body mass index is 33.67 kg/(m^2) as calculated from the following:    Height as of this encounter: 5' 7\" (1.702 m).    Weight as of this encounter: 215 lb (97.5 kg).  Medication Reconciliation: complete     Oswaldo James. MA      "

## 2018-02-19 NOTE — PROGRESS NOTES
"  SUBJECTIVE:   Zoran Dixon is a 32 year old male who presents to clinic today for the following health issues:    RESPIRATORY SYMPTOMS      Duration: 3 days    Description  nasal congestion, rhinorrhea, cough - occasionally productive, chills, ear pain both, headache and fatigue/malaise    Severity: moderate    Accompanying signs and symptoms: None    History (predisposing factors):  none    Precipitating or alleviating factors: None    Therapies tried and outcome:  tylenol    Feeling a little better day but having some headache related to cough.    Problem list and histories reviewed & adjusted, as indicated.  Additional history: as documented    Patient Active Problem List   Diagnosis     Pain in joint, lower leg     CARDIOVASCULAR SCREENING; LDL GOAL LESS THAN 160     Obesity (BMI 30.0-34.9)     Alcohol consumption binge drinking     Congenital abnormality of limb     No past surgical history on file.    Social History   Substance Use Topics     Smoking status: Current Every Day Smoker     Types: Cigarettes     Smokeless tobacco: Never Used     Alcohol use Yes      Comment: occasionally     No family history on file.        Reviewed and updated as needed this visit by clinical staff  Tobacco  Allergies  Meds  Problems       Reviewed and updated as needed this visit by Provider  Allergies  Meds  Problems         ROS:  Constitutional, HEENT, cardiovascular, pulmonary, gi and gu systems are negative, except as otherwise noted.    OBJECTIVE:     /89  Pulse 72  Temp 98  F (36.7  C)  Resp 16  Ht 5' 7\" (1.702 m)  Wt 215 lb (97.5 kg)  SpO2 98%  BMI 33.67 kg/m2  Body mass index is 33.67 kg/(m^2).  GENERAL: healthy, alert and no distress  EYES: Eyes grossly normal to inspection, PERRL and conjunctivae and sclerae normal  HENT: normal cephalic/atraumatic, ear canals and TM's normal, nasal mucosa edematous , oropharynx clear and oral mucous membranes moist  NECK: no adenopathy, no asymmetry, masses, or " scars and thyroid normal to palpation  RESP: lungs clear to auscultation - no rales, rhonchi or wheezes  CV: regular rate and rhythm, normal S1 S2, no S3 or S4, no murmur, click or rub, no peripheral edema and peripheral pulses strong  ABDOMEN: soft, nontender, no hepatosplenomegaly, no masses and bowel sounds normal  MS: no gross musculoskeletal defects noted, no edema    Diagnostic Test Results:  none     ASSESSMENT/PLAN:     1. Viral illness  Symptomatic care.  - benzonatate (TESSALON) 200 MG capsule; Take 1 capsule (200 mg) by mouth 3 times daily as needed for cough  Dispense: 21 capsule; Refill: 0    The uses and side effects, including black box warnings as appropriate, were discussed in detail.  All patient questions were answered.  The patient was instructed to call immediately if any side effects developed.     FUTURE APPOINTMENTS:       - Follow-up visit in 3 - 5 days if not improved.    Mima Pabon MD  [facility]

## 2018-02-20 ENCOUNTER — OFFICE VISIT (OUTPATIENT)
Dept: OTOLARYNGOLOGY | Facility: CLINIC | Age: 33
End: 2018-02-20
Payer: COMMERCIAL

## 2018-02-20 VITALS — WEIGHT: 215 LBS | TEMPERATURE: 98.6 F | BODY MASS INDEX: 33.74 KG/M2 | HEIGHT: 67 IN

## 2018-02-20 DIAGNOSIS — J34.2 DEVIATED NASAL SEPTUM: ICD-10-CM

## 2018-02-20 DIAGNOSIS — J35.1 TONSILLAR HYPERTROPHY: Primary | ICD-10-CM

## 2018-02-20 DIAGNOSIS — J35.01 CHRONIC TONSILLITIS: ICD-10-CM

## 2018-02-20 PROCEDURE — 99204 OFFICE O/P NEW MOD 45 MIN: CPT | Performed by: OTOLARYNGOLOGY

## 2018-02-20 NOTE — MR AVS SNAPSHOT
After Visit Summary   2/20/2018    Zoran Dixon    MRN: [id number]           Patient Information     Date Of Birth          1985        Visit Information        Provider Department      2/20/2018 9:45 AM Jesse Bashir MD; [employer] [facility]        Today's Diagnoses     Tonsillar hypertrophy    -  1    Chronic tonsillitis        Deviated nasal septum          Care Instructions    Scheduling Information  To schedule your CT/MRI scan, please contact [facility] at [phone] OR [facility] at [phone]    To schedule your Surgery, please contact our Specialty Schedulers at [phone]      ENT Clinic Locations Clinic Hours Telephone Number     [facility]  [address] [place] [address]   Monday:           1:00pm -- 5:00pm    Friday:              8:00am - 12:00pm   To schedule/reschedule an appointment with   Dr. Bashir,   please contact our   Specialty Scheduling Department at:     [phone]       [facility]  [address]  [place] [address] Tuesday:          8:00am -- 2:00pm         Urgent Care Locations Clinic Hours Telephone Numbers     [facility]  [address] [place] [address]     Monday-Friday:     11:00am - 9:00pm    Saturday-Sunday:  9:00am - 5:00pm   [phone]     [facility]  [address] [facility] [place] [address]     Monday-Friday:      5:00pm - 9:00pm     Saturday-Sunday:  9:00am - 5:00pm   [phone]                 Follow-ups after your visit        Your next 10 appointments already scheduled     Feb 26, 2018 10:00 AM CST   HST  with BK BED 5   [place] [facility] ([facility])    [address] [place] [id number]   [phone]            Feb 27, 2018  1:30 PM CST   HST Drop Off with BK SC DME   [place] [facility] ([facility])    [address] [place] [address]  "[address] [address]   [phone]            2018  4:00 PM CST   Return Sleep Patient with Jon Landry MD   [facility] ([facility])    [address] [id number] [phone]              Who to contact     If you have questions or need follow up information about today's clinic visit or your schedule please contact [facility] directly at [phone].  Normal or non-critical lab and imaging results will be communicated to you by JustPark[employer], letter or phone within 4 business days after the clinic has received the results. If you do not hear from us within 7 days, please contact the clinic through [employer] or phone. If you have a critical or abnormal lab result, we will notify you by phone as soon as possible.  Submit refill requests through [employer] or call your pharmacy and they will forward the refill request to us. Please allow 3 business days for your refill to be completed.          Additional Information About Your Visit        MyChart Information     [employer] lets you send messages to your doctor, view your test results, renew your prescriptions, schedule appointments and more. To sign up, go to [email or web address] . Click on \"Log in\" on the left side of the screen, which will take you to the Welcome page. Then click on \"Sign up Now\" on the right side of the page.     You will be asked to enter the access code listed below, as well as some personal information. Please follow the directions to create your username and password.     Your access code is: [email or web address]  Expires: 2018  8:20 AM     Your access code will  in 90 days. If you need help or a new code, please call your [facility] or [phone].        Care EveryWhere ID     This is your Care EveryWhere ID. This could be used by other organizations to access your [place] medical " "records  [id number]        Your Vitals Were     Temperature Height BMI (Body Mass Index)             98.6  F (37  C) (Tympanic) 1.702 m (5' 7\") 33.67 kg/m2          Blood Pressure from Last 3 Encounters:   02/19/18 130/89   01/30/18 131/82    Weight from Last 3 Encounters:   02/20/18 97.5 kg (215 lb)   02/19/18 97.5 kg (215 lb)   02/07/18 95.3 kg (210 lb)              Today, you had the following     No orders found for display       Primary Care Provider Fax #    Provider Not In System [phone]                Equal Access to Services     [facility] : Clara Ga, ursula bush, chito chan, jeannie gregg . So [facility] [phone].    ATENCIÓN: Si habla español, tiene a prado disposición servicios gratuitos de asistencia lingüística. Llame al [phone].    We comply with applicable federal civil rights laws and [place] laws. We do not discriminate on the basis of race, color, national origin, age, disability, sex, sexual orientation, or gender identity.            Thank you!     Thank you for choosing [facility]  for your care. Our goal is always to provide you with excellent care. Hearing back from our patients is one way we can continue to improve our services. Please take a few minutes to complete the written survey that you may receive in the mail after your visit with us. Thank you!             Your Updated Medication List - Protect others around you: Learn how to safely use, store and throw away your medicines at www.disposemymeds.org.          This list is accurate as of 2/20/18 10:27 AM.  Always use your most recent med list.                   Brand Name Dispense Instructions for use Diagnosis    benzonatate 200 MG capsule    TESSALON    21 capsule    Take 1 capsule (200 mg) by mouth 3 times daily as needed for cough    Viral illness         "

## 2018-02-20 NOTE — PATIENT INSTRUCTIONS
Scheduling Information  To schedule your CT/MRI scan, please contact [facility] at [phone] OR [facility] at [phone]    To schedule your Surgery, please contact our Specialty Schedulers at [phone]      ENT Clinic Locations Clinic Hours Telephone Number     [facility]  [address] [place] [address]   Monday:           1:00pm -- 5:00pm    Friday:              8:00am - 12:00pm   To schedule/reschedule an appointment with   Dr. Bashir,   please contact our   Specialty Scheduling Department at:     [phone]       [facility]  [address] [place] [address] Tuesday:          8:00am -- 2:00pm         Urgent Care Locations Clinic Hours Telephone Numbers     [facility]  [address] [place] [address]     Monday-Friday:     11:00am - 9:00pm    Saturday-Sunday:  9:00am - 5:00pm   [phone]     [facility]  [address] [place] [address]     Monday-Friday:      5:00pm - 9:00pm     Saturday-Sunday:  9:00am - 5:00pm   [phone]

## 2018-02-20 NOTE — PROGRESS NOTES
"History of Present Illness - Zoran Dixon is a 32 year old male here to see me at the referral of Andi Landry with Sleep Medicine.  The patient was being evaluated for sleep issues, and on exam was found to have exceptiaonlly large tonsils, and was referred to me.    He tells me that for his entire adult life he has had issues breathing through the nose, and even the mouth when lying down.  It is not effected by the seasons, and when he is sick he has to sleep upright because he has such a hard time breathing.  He snores terribly, and is scheduled for a sleep study.  No previous surgery to the ENT.    Past Medical History -   Patient Active Problem List   Diagnosis     Pain in joint, lower leg     CARDIOVASCULAR SCREENING; LDL GOAL LESS THAN 160     Obesity (BMI 30.0-34.9)     Alcohol consumption binge drinking     Congenital abnormality of limb       Current Medications -   Current Outpatient Prescriptions:      benzonatate (TESSALON) 200 MG capsule, Take 1 capsule (200 mg) by mouth 3 times daily as needed for cough, Disp: 21 capsule, Rfl: 0    Allergies - No Known Allergies    Social History -   Social History     Social History     Marital status:      Spouse name: N/A     Number of children: N/A     Years of education: N/A     Social History Main Topics     Smoking status: Current Every Day Smoker     Types: Cigarettes     Smokeless tobacco: Never Used     Alcohol use Yes      Comment: occasionally     Drug use: No     Sexual activity: Yes     Partners: Female     Other Topics Concern     Not on file     Social History Narrative       Family History - No family history on file.    Review of Systems - As per HPI and PMHx, otherwise 10+ system review of the head and neck, and general constitution is negative.    Physical Exam  Temp 98.6  F (37  C) (Tympanic)  Ht 1.702 m (5' 7\")  Wt 97.5 kg (215 lb)  BMI 33.67 kg/m2    General - The patient is well nourished and well developed, and appears to have good " nutritional status.  Alert and oriented to person and place, answers questions and cooperates with examination appropriately.   Head and Face - Normocephalic and atraumatic, with no gross asymmetry noted of the contour of the facial features.  The facial nerve is intact, with strong symmetric movements.  Voice and Breathing - The patient was breathing comfortably without the use of accessory muscles. There was no wheezing, stridor, or stertor.  The patients voice was clear and strong, and had appropriate pitch and quality.  Ears - The tympanic membranes are normal in appearance, bony landmarks are intact.  No retraction, perforation, or masses.  Normal mobility on valsalva maneuver, with no reports of dizziness on insuflation.  No fluid or purulence was seen in the external canal or the middle ear. No evidence of infection of the middle ear or external canal, cerumen was normal in appearance.  Eyes - Extraocular movements intact, and the pupils were reactive to light.  Sclera were not icteric or injected, conjunctiva were pink and moist.  Mouth - Examination of the oral cavity showed pink, healthy oral mucosa. No lesions or ulcerations noted.  The tongue was mobile and midline, and the dentition were in good condition.    Throat - The walls of the oropharynx were smooth, pink, moist, symmetric, and had no lesions or ulcerations.  The tonsillar pillars and soft palate were symmetric.  The uvula was midline on elevation.  The tonsils are very impressive, 4+ and overlapping each other in the midline.  Neck - Normal midline excursion of the laryngotracheal complex during swallowing.  Full range of motion on passive movement.  Palpation of the occipital, submental, submandibular, internal jugular chain, and supraclavicular nodes did not demonstrate any abnormal lymph nodes or masses.  The carotid pulse was palpable bilaterally.  Palpation of the thyroid was soft and smooth, with no nodules or goiter appreciated.  The  trachea was mobile and midline.  Nose - External contour is symmetric, no gross deflection or scars.  Nasal mucosa is pink and moist with no abnormal mucus.  The septum is deflected far to the RIGHT and totally obstructive.      A/P - Zoran Dixon is a 32 year old male  (J35.1) Tonsillar hypertrophy  (primary encounter diagnosis)  (J35.01) Chronic tonsillitis  (J34.2) Deviated nasal septum    Based on the physical exam and history, my recommendation is for tonsillectomy (with or without adenoidectomy).  The remainder of the visit was spent discussing the risks and benefits of tonsillectomy.  These included:  The risks of general anesthesia, bleeding, infection, possible need for emergency surgery to control bleeding, possible alteration of speech and swallowing, and even the possibility of continued throat problems following surgery.  They understood and wished to call in and schedule.    Also, my recommendation is for endoscopic septoplasty with or without turbinate reduction.  I counseled the patient on the risks of surgery, including infection, bleeding, risks of general anesthesia, the risk of failure of the surgery to relieve nasal obstruction, and the possibility of alteration of the appearance of the external nose.  They understood and wished to proceed to scheduling.

## 2018-02-20 NOTE — LETTER
2/20/2018         RE: Zoran Dixon  [address]  [place] [address]        Dear Colleague,    Thank you for referring your patient, Zoran Dixon, to the [facility]. Please see a copy of my visit note below.    History of Present Illness - Zoran Dixon is a 32 year old male here to see me at the referral of Andi Landry with Sleep Medicine.  The patient was being evaluated for sleep issues, and on exam was found to have exceptiaonlly large tonsils, and was referred to me.    He tells me that for his entire adult life he has had issues breathing through the nose, and even the mouth when lying down.  It is not effected by the seasons, and when he is sick he has to sleep upright because he has such a hard time breathing.  He snores terribly, and is scheduled for a sleep study.  No previous surgery to the ENT.    Past Medical History -   Patient Active Problem List   Diagnosis     Pain in joint, lower leg     CARDIOVASCULAR SCREENING; LDL GOAL LESS THAN 160     Obesity (BMI 30.0-34.9)     Alcohol consumption binge drinking     Congenital abnormality of limb       Current Medications -   Current Outpatient Prescriptions:      benzonatate (TESSALON) 200 MG capsule, Take 1 capsule (200 mg) by mouth 3 times daily as needed for cough, Disp: 21 capsule, Rfl: 0    Allergies - No Known Allergies    Social History -   Social History     Social History     Marital status:      Spouse name: N/A     Number of children: N/A     Years of education: N/A     Social History Main Topics     Smoking status: Current Every Day Smoker     Types: Cigarettes     Smokeless tobacco: Never Used     Alcohol use Yes      Comment: occasionally     Drug use: No     Sexual activity: Yes     Partners: Female     Other Topics Concern     Not on file     Social History Narrative       Family History - No family history on file.    Review of Systems - As per HPI and PMHx, otherwise 10+ system review of the head and  "neck, and general constitution is negative.    Physical Exam  Temp 98.6  F (37  C) (Tympanic)  Ht 1.702 m (5' 7\")  Wt 97.5 kg (215 lb)  BMI 33.67 kg/m2    General - The patient is well nourished and well developed, and appears to have good nutritional status.  Alert and oriented to person and place, answers questions and cooperates with examination appropriately.   Head and Face - Normocephalic and atraumatic, with no gross asymmetry noted of the contour of the facial features.  The facial nerve is intact, with strong symmetric movements.  Voice and Breathing - The patient was breathing comfortably without the use of accessory muscles. There was no wheezing, stridor, or stertor.  The patients voice was clear and strong, and had appropriate pitch and quality.  Ears - The tympanic membranes are normal in appearance, bony landmarks are intact.  No retraction, perforation, or masses.  Normal mobility on valsalva maneuver, with no reports of dizziness on insuflation.  No fluid or purulence was seen in the external canal or the middle ear. No evidence of infection of the middle ear or external canal, cerumen was normal in appearance.  Eyes - Extraocular movements intact, and the pupils were reactive to light.  Sclera were not icteric or injected, conjunctiva were pink and moist.  Mouth - Examination of the oral cavity showed pink, healthy oral mucosa. No lesions or ulcerations noted.  The tongue was mobile and midline, and the dentition were in good condition.    Throat - The walls of the oropharynx were smooth, pink, moist, symmetric, and had no lesions or ulcerations.  The tonsillar pillars and soft palate were symmetric.  The uvula was midline on elevation.  The tonsils are very impressive, 4+ and overlapping each other in the midline.  Neck - Normal midline excursion of the laryngotracheal complex during swallowing.  Full range of motion on passive movement.  Palpation of the occipital, submental, submandibular, " internal jugular chain, and supraclavicular nodes did not demonstrate any abnormal lymph nodes or masses.  The carotid pulse was palpable bilaterally.  Palpation of the thyroid was soft and smooth, with no nodules or goiter appreciated.  The trachea was mobile and midline.  Nose - External contour is symmetric, no gross deflection or scars.  Nasal mucosa is pink and moist with no abnormal mucus.  The septum is deflected far to the RIGHT and totally obstructive.      A/P - Zoran Dixon is a 32 year old male  (J35.1) Tonsillar hypertrophy  (primary encounter diagnosis)  (J35.01) Chronic tonsillitis  (J34.2) Deviated nasal septum    Based on the physical exam and history, my recommendation is for tonsillectomy (with or without adenoidectomy).  The remainder of the visit was spent discussing the risks and benefits of tonsillectomy.  These included:  The risks of general anesthesia, bleeding, infection, possible need for emergency surgery to control bleeding, possible alteration of speech and swallowing, and even the possibility of continued throat problems following surgery.  They understood and wished to call in and schedule.    Also, my recommendation is for endoscopic septoplasty with or without turbinate reduction.  I counseled the patient on the risks of surgery, including infection, bleeding, risks of general anesthesia, the risk of failure of the surgery to relieve nasal obstruction, and the possibility of alteration of the appearance of the external nose.  They understood and wished to proceed to scheduling.          Again, thank you for allowing me to participate in the care of your patient.        Sincerely,        Jesse Bashir MD

## 2018-02-23 ENCOUNTER — TELEPHONE (OUTPATIENT)
Dept: SLEEP MEDICINE | Facility: CLINIC | Age: 33
End: 2018-02-23

## 2018-02-23 NOTE — TELEPHONE ENCOUNTER
Arden called and said he is going to be getting surgery to remove his tonsils and also fix something with his nose. He thinks he should wait until after surgery to decide if he should do the HST. He wants Dr. Landry' opinion. He is currently scheduled to  HST on Monday 2/26. Please leet patient know what he should do. Thanks!

## 2018-02-27 ENCOUNTER — OFFICE VISIT (OUTPATIENT)
Dept: SLEEP MEDICINE | Facility: CLINIC | Age: 33
End: 2018-02-27
Payer: COMMERCIAL

## 2018-02-27 DIAGNOSIS — G47.33 OSA (OBSTRUCTIVE SLEEP APNEA): ICD-10-CM

## 2018-02-27 PROCEDURE — G0399 HOME SLEEP TEST/TYPE 3 PORTA: HCPCS | Performed by: INTERNAL MEDICINE

## 2018-02-27 NOTE — Clinical Note
Dr. Bashir,   I wanted to give you an FYI regarding Zoran.  His Home Sleep Apnea Testing was impressively bad.  The scan will be in the system later this afternoon, but he had fairly profound hypoxia on top of the Obstructive Sleep Apnea.  Hypoxia was noted during supine sleep and I cannot help but think it is related to his airway anatomy, so I'm glad he is seeing you for care.  I am messaging you because based on the pattern of respiration, I would suspect he is at much higher risk of perioperative respiratory compromise than his overall health would otherwise suggest.  I would consider him fairly high risk for perioperative complications and would recommend letting anesthesia know. Let me know if I can provide additional details, specifics, or recommendations.   Sincerely,   Jon Landry MD

## 2018-02-27 NOTE — MR AVS SNAPSHOT
After Visit Summary   2/27/2018    Zoran Dixon    MRN: [id number]           Patient Information     Date Of Birth          1985        Visit Information        Provider Department      2/27/2018 3:00 PM [place] [facility]        Today's Diagnoses     BENJI (obstructive sleep apnea)           Follow-ups after your visit        Your next 10 appointments already scheduled     Feb 28, 2018  3:00 PM CST   HST Drop Off with [facility]   [facility] ([facility])    [address]  [place] [id number] [phone]            Feb 28, 2018  4:15 PM CST   Telephone Visit with Jon Landry MD   [facility] ([facility])    [address]  [place] [facility] [address] [phone]           Note: this is not an onsite visit; there is no need to come to the facility.              Who to contact     If you have questions or need follow up information about today's clinic visit or your schedule please contact [facility] directly at [phone].  Normal or non-critical lab and imaging results will be communicated to you by MyChart, letter or phone within 4 business days after the clinic has received the results. If you do not hear from us within 7 days, please contact the clinic through MyChart or phone. If you have a critical or abnormal lab result, we will notify you by phone as soon as possible.  Submit refill requests through [employer] or call your pharmacy and they will forward the refill request to us. Please allow 3 business days for your refill to be completed.          Additional Information About Your Visit        "[employer] - The Bigger Picture Information     [employer] lets you send messages to your doctor, view your test results, renew your prescriptions, schedule appointments and more. To sign up, go to  [email or web address] . Click on \"Log in\" on the left side of the screen, which will take you to the Welcome page. Then click on \"Sign up Now\" on the right side of the page.     You will be asked to enter the access code listed below, as well as some personal information. Please follow the directions to create your username and password.     Your access code is: [email or web address]  Expires: 2018  8:20 AM     Your access code will  in 90 days. If you need help or a new code, please call your [place] clinic or [phone].        Care EveryWhere ID     This is your Care EveryWhere ID. This could be used by other organizations to access your [place] medical records  [id number]         Blood Pressure from Last 3 Encounters:   18 130/89   18 131/82    Weight from Last 3 Encounters:   18 97.5 kg (215 lb)   18 97.5 kg (215 lb)   18 95.3 kg (210 lb)              We Performed the Following     HST-Home Sleep Apnea Test        Primary Care Provider Fax #    Provider Not In System [phone]                Equal Access to Services     CHERI STRAUSS : Hadii alicia Ga, waaxda gageadaha, qaybta kaalmada adeegyada, jeannie gregg . So [facility] [phone].    ATENCIÓN: Si habla español, tiene a prado disposición servicios gratuitos de asistencia lingüística. Llame al [phone].    We comply with applicable federal civil rights laws and [place] laws. We do not discriminate on the basis of race, color, national origin, age, disability, sex, sexual orientation, or gender identity.            Thank you!     Thank you for choosing [facility]  for your care. Our goal is always to provide you with excellent care. Hearing back from our patients is one way we can continue to improve our services. Please take a few minutes to complete the written survey that you may receive in the mail after your visit with us. Thank you!             Your " Updated Medication List - Protect others around you: Learn how to safely use, store and throw away your medicines at www.disposemymeds.org.      Notice  As of 2/27/2018  3:46 PM    You have not been prescribed any medications.

## 2018-02-27 NOTE — PROGRESS NOTES
Pt is completing a home sleep test for concerns primarily related to snoring and suspected BENJI. He was instructed on how to put on the Noxturnal T3 device and associated equipment before going to bed and given the opportunity to practice putting it on before leaving the sleep center. He was reminded to bring the home sleep test kit back to the center tomorrow for download and reporting.

## 2018-02-28 ENCOUNTER — TELEPHONE (OUTPATIENT)
Dept: OTOLARYNGOLOGY | Facility: CLINIC | Age: 33
End: 2018-02-28

## 2018-02-28 ENCOUNTER — VIRTUAL VISIT (OUTPATIENT)
Dept: INTERPRETER SERVICES | Facility: CLINIC | Age: 33
End: 2018-02-28

## 2018-02-28 ENCOUNTER — APPOINTMENT (OUTPATIENT)
Dept: SLEEP MEDICINE | Facility: CLINIC | Age: 33
End: 2018-02-28
Payer: COMMERCIAL

## 2018-02-28 ENCOUNTER — VIRTUAL VISIT (OUTPATIENT)
Dept: SLEEP MEDICINE | Facility: CLINIC | Age: 33
End: 2018-02-28
Payer: COMMERCIAL

## 2018-02-28 DIAGNOSIS — J35.1 HYPERTROPHY OF TONSILS: ICD-10-CM

## 2018-02-28 DIAGNOSIS — J34.2 DEVIATED NASAL SEPTUM: Primary | ICD-10-CM

## 2018-02-28 DIAGNOSIS — G47.33 OSA (OBSTRUCTIVE SLEEP APNEA): ICD-10-CM

## 2018-02-28 PROCEDURE — 99441 ZZC PHYSICIAN TELEPHONE EVALUATION 5-10 MIN: CPT | Performed by: INTERNAL MEDICINE

## 2018-02-28 NOTE — TELEPHONE ENCOUNTER
Type of surgery: Bilateral tonsillectomy,possible adenoidectomy,Endoscopic septoplasty with turbinate reduction CPT 05647  Deviated nasal septum [J34.2]  - Primary       Hypertrophy of tonsils [J35.1]         Location of surgery: [facility]  Date and time of surgery: 03/27/2018  Surgeon: Jesse Bashir  Pre-Op Appt Date: 03/20/2018   Post-Op Appt Date: 04/17/2018   Packet sent out: Yes  Pre-cert/Authorization completed:  No prior auth needed  Date: 02/28/18

## 2018-02-28 NOTE — MR AVS SNAPSHOT
After Visit Summary   2/28/2018    Zoran Dixon    MRN: [id number]           Patient Information     Date Of Birth          1985        Visit Information        Provider Department      2/28/2018 4:15 PM Jon Landry MD [facility]        Today's Diagnoses     BENJI (obstructive sleep apnea)           Follow-ups after your visit        Your next 10 appointments already scheduled     Mar 20, 2018  8:20 AM CDT   Pre-Op physical with Pradip Mclean MD   [facility] ([facility]    [address] [address]   [phone]            Mar 27, 2018   Procedure with Jesse Bashir MD   [facility] (--)    [address]  [facility] [address]   [phone]            Apr 17, 2018  8:30 AM CDT   Post Op with Jesse Bashir MD   [facility] ([facility])    [address] [address] [phone]              Future tests that were ordered for you today     Open Future Orders        Priority Expected Expires Ordered    Comprehensive Sleep Study Routine  8/27/2018 2/28/2018            Who to contact     If you have questions or need follow up information about today's clinic visit or your schedule please contact [facility] directly at [phone].  Normal or non-critical lab and imaging results will be communicated to you by MyChart, letter or phone within 4 business days after the clinic has received the results. If you do not hear from us within 7 days, please contact the clinic through MyChart or phone. If you have a critical or abnormal lab result, we will notify you by phone as soon as possible.  Submit refill requests through [employer] or call your pharmacy and they will forward the refill request to us. Please allow 3 business days for your refill to be completed.          Additional Information  "About Your Visit        MyChart Information     Interse lets you send messages to your doctor, view your test results, renew your prescriptions, schedule appointments and more. To sign up, go to [email or web address] . Click on \"Log in\" on the left side of the screen, which will take you to the Welcome page. Then click on \"Sign up Now\" on the right side of the page.     You will be asked to enter the access code listed below, as well as some personal information. Please follow the directions to create your username and password.     Your access code is: [email or web address]  Expires: 2018  8:20 AM     Your access code will  in 90 days. If you need help or a new code, please call your [place] clinic or [phone].        Care EveryWhere ID     This is your Care EveryWhere ID. This could be used by other organizations to access your [place] medical records  [id number]         Blood Pressure from Last 3 Encounters:   18 130/89   18 131/82    Weight from Last 3 Encounters:   18 97.5 kg (215 lb)   18 97.5 kg (215 lb)   18 95.3 kg (210 lb)               Primary Care Provider Fax #    Provider Not In System [phone]                Equal Access to Services     [facility] CenterDANIEL : Hadii alicia yino Soaysha, waaxda luqadaha, qaybta kaalmada dustin, jeannie gregg . So [facility] [phone].    ATENCIÓN: Si habla español, tiene a prado disposición servicios gratuitos de asistencia lingüística. Llame al [phone].    We comply with applicable federal civil rights laws and [place] laws. We do not discriminate on the basis of race, color, national origin, age, disability, sex, sexual orientation, or gender identity.            Thank you!     Thank you for choosing [facility]  for your care. Our goal is always to provide you with excellent care. Hearing back from our patients is one way we can continue to improve our services. Please take a few " minutes to complete the written survey that you may receive in the mail after your visit with us. Thank you!             Your Updated Medication List - Protect others around you: Learn how to safely use, store and throw away your medicines at www.disposemymeds.org.      Notice  As of 2/28/2018  4:36 PM    You have not been prescribed any medications.

## 2018-02-28 NOTE — PROGRESS NOTES
"Zoran Dixon is a 32 year old male who is being evaluated via a telephone visit.      The patient has been notified of following:     \"This telephone visit will be conducted via a call between you and your physician/provider. We have found that certain health care needs can be provided without the need for a physical exam.  This service lets us provide the care you need with a short phone conversation.  If a prescription is necessary we can send it directly to your pharmacy.  If lab work is needed we can place an order for that and you can then stop by our lab to have the test done at a later time.    We will bill your insurance company for this service.  Please check with your medical insurance if this type of visit is covered. You may be responsible for the cost of this type of visit if insurance coverage is denied.  The typical cost is $30 (10min), $59 (11-20min) and $85 (21-30min).  Most often these visits are shorter than 10 minutes.    If during the course of the call the physician/provider feels a telephone visit is not appropriate, you will not be charged for this service.\"       Consent has been obtained for this service by care team member: yes.   See the scanned image in the medical record.    Zoran Dixon complains of  Results (HST results)      I have reviewed and updated the patient's Past Medical History, Social History, Family History and Medication List.    ALLERGIES  Review of patient's allergies indicates no known allergies.    Michelle Gracia   (MA signature)    Additional provider notes:   Zoran Dixon is a 32 year old male who returns to [facility] after having had Home Sleep Apnea Testing.  He presented with symptoms suggestive of obstructive sleep apnea.    Estimated body mass index is 33.67 kg/(m^2) as calculated from the following:    Height as of 2/20/18: 1.702 m (5' 7\").    Weight as of 2/20/18: 97.5 kg (215 lb).  Total score - [place]: 1 (2/7/2018  8:00 AM)  Total " "Score: 5 (2/7/2018  9:05 AM)    Home Sleep Apnea Testing - 2/27/18: AHI 56.9/hr. Supine AHI 41.1/hr.   Oxygen Long of 51%.  Baseline 87.1%.  Sp02 =< 88% for 333 minutes  He slept on his back (68.6%), prone (0%), left (14.5) and right (16.6%) sides.   Analysis time: 481.2 minutes.     Signal quality of Oxymeter 100% Good  Nasal Cannula 100% Good  RIP belts 100% Good.     Zoran Dxion reports that he slept \"so-so.\"    These findings were reviewed with patient.     Assessment/Plan:    ICD-10-CM    1. BENJI (obstructive sleep apnea) G47.33 Comprehensive Sleep Study     Very Severe Obstructive Sleep Apnea.   Sleep associated hypoxemia profound and independent of Obstructive Sleep Apnea.     Home Sleep Apnea Testing was reviewed in detail today with Zoran and a copy given to him for his records.     Treatment options discussed today including  polysomnography with full night PAP titration given severity of hypoxia and evidence hypoventilation.  Will have care team call patient in AM to schedule urgent Titration Polysomnography.    I have reviewed the note as documented above.  This accurately captures the substance of my conversation with the patient, Zoran Dixon     Total time of call between patient and provider was 10 minutes.  "

## 2018-02-28 NOTE — PROCEDURES
"HOME SLEEP STUDY INTERPRETATION    Patient: Zoran Dixon  MRN: [id number]  YOB: 1985  Study Date: 2/27/2018  Referring Provider: System, Provider Not In;   Ordering Provider: Jon Landry MD     Indications for Home Study: Zoran Dixon is a 32 year old male with a history of obesity, binge drinking, and tonsillar hypertrophy who presents with symptoms suggestive of obstructive sleep apnea.    Estimated body mass index is 33.67 kg/(m^2) as calculated from the following:    Height as of 2/20/18: 1.702 m (5' 7\").    Weight as of 2/20/18: 97.5 kg (215 lb).  Total score - [place]: 1 (2/7/2018  8:00 AM)  Total Score: 5 (2/7/2018  9:05 AM)    Data: A full night home sleep study was performed recording the standard physiologic parameters including body position, movement, sound, nasal pressure, thermal oral airflow, chest and abdominal movements with respiratory inductance plethysmography, and oxygen saturation by pulse oximetry. Pulse rate was estimated by oximetry recording. This study was considered adequate based on > 4 hours of quality oximetry and respiratory recording. As specified by the AASM Manual for the Scoring of Sleep and Associated events, version 2.3, Rule VIII.D 1B, 4% oxygen desaturation scoring for hypopneas is used as a standard of care on all home sleep apnea testing.    Analysis Time:  481.2 minutes    Respiration:   Sleep Associated Hypoxemia: sustained hypoxemia was present. Baseline oxygen saturation was 87.1%.  Time with saturation less than or equal to 88% was 333 minutes. The lowest oxygen saturation was 51%.   Snoring: Snoring was present.  Respiratory events: The home study revealed a presence of 39 obstructive apneas and 65 mixed and central apneas. There were 352 hypopneas resulting in a combined apnea/hypopnea index [AHI] of 56.9 events per hour.  AHI was 41.1 per hour supine, 65.5 per hour prone, 86.8 per hour on left side, and 95.9 per hour on right side.   Pattern: " Excluding events noted above, respiratory rate and pattern was Normal.    Position: Percent of time spent: supine - 68.6%, prone - 0.2%, on left - 14.5%, on right - 16.6%.    Heart Rate: By pulse oximetry tachycardia was noted.     Assessment:   Severe obstructive sleep apnea.  Sleep associated hypoxemia was present and independent of Obstructive Sleep Apnea.    Recommendations:  Consider polysomnography with full night PAP titration.  Suggest optimizing sleep hygiene and avoiding sleep deprivation.  Weight management.    Diagnosis Code(s): Obstructive Sleep Apnea G47.33, Hypoxemia G47.36    Jon Landry MD, February 28, 2018   Diplomate, American Board of Internal Medicine, Sleep Medicine

## 2018-02-28 NOTE — PROGRESS NOTES
This HST performed using a Noxturnal T3 device which recorded snore, sound, movement activity, body position, nasal pressure, oronasal thermal airflow, pulse, oximetry and both chest and abdominal respiratory effort. HST data was confined to the time patients states they were in bed.   Patient was score using 4% rules. Patient respiratory events showed an AHI 56.9 with loud snoring. Patient SP02 below 89% was 333.0 minutes. Overall signal quality was good.    Pt will follow up with sleep provider to determine appropriate therapy.     Ordering Gris PRINGLE [place], MD

## 2018-03-21 ENCOUNTER — ANESTHESIA EVENT (OUTPATIENT)
Dept: SURGERY | Facility: AMBULATORY SURGERY CENTER | Age: 33
End: 2018-03-21

## 2018-03-21 ENCOUNTER — OFFICE VISIT (OUTPATIENT)
Dept: FAMILY MEDICINE | Facility: CLINIC | Age: 33
End: 2018-03-21
Payer: COMMERCIAL

## 2018-03-21 VITALS
DIASTOLIC BLOOD PRESSURE: 88 MMHG | BODY MASS INDEX: 33.43 KG/M2 | OXYGEN SATURATION: 98 % | SYSTOLIC BLOOD PRESSURE: 118 MMHG | HEIGHT: 67 IN | TEMPERATURE: 96.7 F | WEIGHT: 213 LBS | HEART RATE: 86 BPM

## 2018-03-21 DIAGNOSIS — Z01.818 PREOP GENERAL PHYSICAL EXAM: Primary | ICD-10-CM

## 2018-03-21 LAB
ERYTHROCYTE [DISTWIDTH] IN BLOOD BY AUTOMATED COUNT: 14.4 % (ref 10–15)
HCT VFR BLD AUTO: 41.9 % (ref 40–53)
HGB BLD-MCNC: 13.4 G/DL (ref 13.3–17.7)
MCH RBC QN AUTO: 26.8 PG (ref 26.5–33)
MCHC RBC AUTO-ENTMCNC: 32 G/DL (ref 31.5–36.5)
MCV RBC AUTO: 84 FL (ref 78–100)
PLATELET # BLD AUTO: 228 10E9/L (ref 150–450)
RBC # BLD AUTO: 5 10E12/L (ref 4.4–5.9)
WBC # BLD AUTO: 5.7 10E9/L (ref 4–11)

## 2018-03-21 PROCEDURE — 99214 OFFICE O/P EST MOD 30 MIN: CPT | Performed by: FAMILY MEDICINE

## 2018-03-21 PROCEDURE — 85027 COMPLETE CBC AUTOMATED: CPT | Performed by: FAMILY MEDICINE

## 2018-03-21 PROCEDURE — 36415 COLL VENOUS BLD VENIPUNCTURE: CPT | Performed by: FAMILY MEDICINE

## 2018-03-21 ASSESSMENT — PAIN SCALES - GENERAL: PAINLEVEL: NO PAIN (0)

## 2018-03-21 NOTE — MR AVS SNAPSHOT
After Visit Summary   3/21/2018    Zoran Dixon    MRN: [id number]           Patient Information     Date Of Birth          1985        Visit Information        Provider Department      3/21/2018 8:15 AM Pradip Mclean MD; [employer] [facility]        Today's Diagnoses     Preop general physical exam    -  1      Care Instructions      Before Your Surgery      Call your surgeon if there is any change in your health. This includes signs of a cold or flu (such as a sore throat, runny nose, cough, rash or fever).    Do not smoke, drink alcohol or take over the counter medicine (unless your surgeon or primary care doctor tells you to) for the 24 hours before and after surgery.    If you take prescribed drugs: Follow your doctor s orders about which medicines to take and which to stop until after surgery.    Eating and drinking prior to surgery: follow the instructions from your surgeon    Take a shower or bath the night before surgery. Use the soap your surgeon gave you to gently clean your skin. If you do not have soap from your surgeon, use your regular soap. Do not shave or scrub the surgery site.  Wear clean pajamas and have clean sheets on your bed.     At [facility], we strive to deliver an exceptional experience to you, every time we see you.  If you receive a survey in the mail, please send us back your thoughts. We really do value your feedback.    Based on your medical history, these are the current health maintenance/preventive care services that you are due for (some may have been done at this visit.)  There are no preventive care reminders to display for this patient.      Suggested websites for health information:  Www.E-Box - Blogo.it.org : Up to date and easily searchable information on multiple topics.  Www.medlineplus.gov : medication info, interactive tutorials, watch real surgeries online  Www.familydoctor.org : good info from the  Academy of Family Physicians  Www.cdc.gov : public health info, travel advisories, epidemics (H1N1)  Www.aap.org : children's health info, normal development, vaccinations  Www.health.[facility].[place].us : [place] dept of health, public health issues in [place], N1N1    Your care team:                            Family Medicine Internal Medicine   MD Landon Flanagan MD Shantel Branch-Fleming, MD Katya Georgiev PA-C Nam Ho, MD Pediatrics   KANU Beckham, PAOLA Dye APRN CNP   MD Mary Blood MD Deborah Mielke, MD Roro Sorensen, APRN CNP      Clinic hours: Monday - Thursday 7 am-7 pm; Fridays 7 am-5 pm.   Urgent care: Monday - Friday 11 am-9 pm; Saturday and Sunday 9 am-5 pm.  Pharmacy : Monday -Thursday 8 am-8 pm; Friday 8 am-6 pm; Saturday and Sunday 9 am-5 pm.     Clinic: [phone]   Pharmacy: [phone]            Follow-ups after your visit        Your next 10 appointments already scheduled     Mar 27, 2018   Procedure with Jesse Bashir MD   [facility] (--)    [address] [facility] [address] [phone]            Apr 17, 2018  8:30 AM CDT   Post Op with Jesse Bashir MD   [facility]    [address] [place] [address] [phone]              Who to contact     If you have questions or need follow up information about today's clinic visit or your schedule please contact [facility] directly at [phone].  Normal or non-critical lab and imaging results will be communicated to you by MyChart, letter or phone within 4 business days after the clinic has received the results. If you do not hear from us within 7 days, please contact the clinic through MyChart or phone. If you have a critical or abnormal lab result, we will notify you by phone as soon as possible.  Submit refill requests through [employer] or call  "your pharmacy and they will forward the refill request to us. Please allow 3 business days for your refill to be completed.          Additional Information About Your Visit        [employer] Information     [employer] lets you send messages to your doctor, view your test results, renew your prescriptions, schedule appointments and more. To sign up, go to [email or web address] . Click on \"Log in\" on the left side of the screen, which will take you to the Welcome page. Then click on \"Sign up Now\" on the right side of the page.     You will be asked to enter the access code listed below, as well as some personal information. Please follow the directions to create your username and password.     Your access code is: [email or web address]  Expires: 2018  9:20 AM     Your access code will  in 90 days. If you need help or a new code, please call your [place] clinic or [phone].        Care EveryWhere ID     This is your Care EveryWhere ID. This could be used by other organizations to access your [place] medical records  [id number]        Your Vitals Were     Pulse Temperature Height Pulse Oximetry BMI (Body Mass Index)       86 96.7  F (35.9  C) (Oral) 5' 7\" (1.702 m) 98% 33.36 kg/m2        Blood Pressure from Last 3 Encounters:   18 118/88   18 130/89   18 131/82    Weight from Last 3 Encounters:   18 213 lb (96.6 kg)   18 210 lb (95.3 kg)   18 215 lb (97.5 kg)              We Performed the Following     CBC with platelets        Primary Care Provider Fax #    Provider Not In System [phone]                Equal Access to Services     [facility] : Hadii alicia Ga, ursula bush, jeannie hines. So [facility] [place] [phone].    ATENCIÓN: Si habla español, tiene a prado disposición servicios gratuitos de asistencia lingüística. Llame al [phone].    We comply with applicable federal civil rights laws and [place] " laws. We do not discriminate on the basis of race, color, national origin, age, disability, sex, sexual orientation, or gender identity.            Thank you!     Thank you for choosing [facility]  for your care. Our goal is always to provide you with excellent care. Hearing back from our patients is one way we can continue to improve our services. Please take a few minutes to complete the written survey that you may receive in the mail after your visit with us. Thank you!             Your Updated Medication List - Protect others around you: Learn how to safely use, store and throw away your medicines at www.disposemymeds.org.      Notice  As of 3/21/2018  8:36 AM    You have not been prescribed any medications.

## 2018-03-21 NOTE — PATIENT INSTRUCTIONS
Before Your Surgery      Call your surgeon if there is any change in your health. This includes signs of a cold or flu (such as a sore throat, runny nose, cough, rash or fever).    Do not smoke, drink alcohol or take over the counter medicine (unless your surgeon or primary care doctor tells you to) for the 24 hours before and after surgery.    If you take prescribed drugs: Follow your doctor s orders about which medicines to take and which to stop until after surgery.    Eating and drinking prior to surgery: follow the instructions from your surgeon    Take a shower or bath the night before surgery. Use the soap your surgeon gave you to gently clean your skin. If you do not have soap from your surgeon, use your regular soap. Do not shave or scrub the surgery site.  Wear clean pajamas and have clean sheets on your bed.     At [facility], we strive to deliver an exceptional experience to you, every time we see you.  If you receive a survey in the mail, please send us back your thoughts. We really do value your feedback.    Based on your medical history, these are the current health maintenance/preventive care services that you are due for (some may have been done at this visit.)  There are no preventive care reminders to display for this patient.      Suggested websites for health information:  Www.[place].org : Up to date and easily searchable information on multiple topics.  Www.medlineplus.gov : medication info, interactive tutorials, watch real surgeries online  Www.familydoctor.org : good info from the Academy of Family Physicians  Www.cdc.gov : public health info, travel advisories, epidemics (H1N1)  Www.aap.org : children's health info, normal development, vaccinations  Www.health.state.[place].us : [place] dept of health, public health issues in [place], N1N1    Your care team:                            Family Medicine Internal Medicine   MD Landon Flanagan MD Shantel Branch-Fleming,  MD Cecelia Mclean MD Pediatrics   KANU Beckham, MD Mary Robb CNP, MD Deborah Mielke, MD Kim Thein, APRN CNP      Clinic hours: Monday - Thursday 7 am-7 pm; Fridays 7 am-5 pm.   Urgent care: Monday - Friday 11 am-9 pm; Saturday and Sunday 9 am-5 pm.  Pharmacy : Monday -Thursday 8 am-8 pm; Friday 8 am-6 pm; Saturday and Sunday 9 am-5 pm.     Clinic: [phone]   Pharmacy: [phone]

## 2018-03-21 NOTE — PROGRESS NOTES
[address] [address]  [phone]  Dept: [phone]    PRE-OP EVALUATION:  Today's date: 3/21/2018    Zoran Dixon (: 1985) presents for pre-operative evaluation assessment as requested by Dr. Bashir.  He requires evaluation and anesthesia risk assessment prior to undergoing surgery/procedure for treatment of tonsillar and adenoid hypertrophy.    Proposed Surgery/ Procedure: COMBINED TONSILLECTOMY, ADENOIDECTOMY  Date of Surgery/ Procedure: 3/27/18  Time of Surgery/ Procedure: 9:30 am  Hospital/Surgical Facility: [facility]  Fax number for surgical facility: does not have  Primary Physician: System, Provider Not In  Type of Anesthesia Anticipated: General    Patient has a Health Care Directive or Living Will:  NO    1. NO - Do you have a history of heart attack, stroke, stent, bypass or surgery on an artery in the head, neck, heart or legs?  2. NO - Do you ever have any pain or discomfort in your chest?  3. NO - Do you have a history of  Heart Failure?  4. NO - Are you troubled by shortness of breath when: walking on the level, up a slight hill or at night?  5. NO - Do you currently have a cold, bronchitis or other respiratory infection?  6. NO - Do you have a cough, shortness of breath or wheezing?  7. NO - Do you sometimes get pains in the calves of your legs when you walk?  8. NO - Do you or anyone in your family have previous history of blood clots?  9. NO - Do you or does anyone in your family have a serious bleeding problem such as prolonged bleeding following surgeries or cuts?  10. NO - Have you ever had problems with anemia or been told to take iron pills?  11. NO - Have you had any abnormal blood loss such as black, tarry or bloody stools, or abnormal vaginal bleeding?  12. NO - Have you ever had a blood transfusion?  13. NO - Have you or any of your relatives ever had problems with anesthesia?  14. YES - DO YOU HAVE SLEEP  APNEA, EXCESSIVE SNORING OR DAYTIME DROWSINESS?  15. NO - Do you have any prosthetic heart valves?  16. NO - Do you have prosthetic joints?  17. NO - Is there any chance that you may be pregnant?      HPI:     HPI related to upcoming procedure: h/o BENJI with hypertrophy of tonsils and adenoids.      See problem list for active medical problems.  Problems all longstanding and stable, except as noted/documented.  See ROS for pertinent symptoms related to these conditions.                                                                                                  .    MEDICAL HISTORY:     Patient Active Problem List    Diagnosis Date Noted     BENJI (obstructive sleep apnea) 02/28/2018     Priority: Medium     2/27/2018 [facility] Sleep Apnea Testing - AHI 56.9/hr; Supine AHI 41.1/hr; SpO2 <= 88% for 333 minutes.   Profound hypoxia independent of Obstructive Sleep Apnea.       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 No pertinent past medical history.  History reviewed. No pertinent surgical history.  No current outpatient prescriptions on file.     OTC products: no recent use of OTC ASA, NSAIDS or Steroids    No Known Allergies   Latex Allergy: NO    Social History   Substance Use Topics     Smoking status: Former Smoker     Types: Cigarettes     Quit date: 12/1/2017     Smokeless tobacco: Never Used     Alcohol use Yes      Comment: occasionally     History   Drug Use No       REVIEW OF SYSTEMS:   CONSTITUTIONAL: NEGATIVE for fever, chills, change in weight  ENT/MOUTH: NEGATIVE for ear, mouth and throat problems  RESP: NEGATIVE  "for significant cough or SOB  CV: NEGATIVE for chest pain, palpitations or peripheral edema    EXAM:   /88 (BP Location: Left arm, Patient Position: Chair, Cuff Size: Adult Large)  Pulse 86  Temp 96.7  F (35.9  C) (Oral)  Ht 5' 7\" (1.702 m)  Wt 213 lb (96.6 kg)  SpO2 98%  BMI 33.36 kg/m2  GENERAL APPEARANCE: healthy, alert and no distress  HENT: ear canals and TM's normal and nose and mouth without ulcers or lesions  RESP: lungs clear to auscultation - no rales, rhonchi or wheezes  CV: regular rate and rhythm, normal S1 S2, no S3 or S4 and no murmur, click or rub   ABDOMEN: soft, nontender, no HSM or masses and bowel sounds normal  NEURO: Normal strength and tone, sensory exam grossly normal, mentation intact and speech normal    DIAGNOSTICS:   Cbc pending.    No results for input(s): HGB, PLT, INR, NA, POTASSIUM, CR, A1C in the last 92533 hours.     IMPRESSION:   Reason for surgery/procedure: tonsillectomy and adenoidectomy  Diagnosis/reason for consult: preop clearance    The proposed surgical procedure is considered LOW risk.    REVISED CARDIAC RISK INDEX  The patient has the following serious cardiovascular risks for perioperative complications such as (MI, PE, VFib and 3  AV Block):  No serious cardiac risks  INTERPRETATION: 0 risks: Class I (very low risk - 0.4% complication rate)    The patient has the following additional risks for perioperative complications:  No identified additional risks      ICD-10-CM    1. Preop general physical exam Z01.818 CBC with platelets       RECOMMENDATIONS:     APPROVAL GIVEN to proceed with proposed procedure, without further diagnostic evaluation       Signed Electronically by: Pradip Mclean MD, MD    Copy of this evaluation report is provided to requesting physician.    Ann Preop Guidelines  "

## 2018-03-27 ENCOUNTER — TELEPHONE (OUTPATIENT)
Dept: OTOLARYNGOLOGY | Facility: CLINIC | Age: 33
End: 2018-03-27

## 2018-03-27 ENCOUNTER — HOSPITAL ENCOUNTER (OUTPATIENT)
Facility: AMBULATORY SURGERY CENTER | Age: 33
Discharge: HOME | End: 2018-03-27
Attending: OTOLARYNGOLOGY | Admitting: OTOLARYNGOLOGY
Payer: COMMERCIAL

## 2018-03-27 ENCOUNTER — ANESTHESIA (OUTPATIENT)
Dept: SURGERY | Facility: AMBULATORY SURGERY CENTER | Age: 33
End: 2018-03-27
Payer: COMMERCIAL

## 2018-03-27 ENCOUNTER — SURGERY (OUTPATIENT)
Age: 33
End: 2018-03-27

## 2018-03-27 VITALS
TEMPERATURE: 97.6 F | DIASTOLIC BLOOD PRESSURE: 95 MMHG | OXYGEN SATURATION: 98 % | HEIGHT: 67 IN | WEIGHT: 210 LBS | RESPIRATION RATE: 12 BRPM | HEART RATE: 91 BPM | BODY MASS INDEX: 32.96 KG/M2 | SYSTOLIC BLOOD PRESSURE: 139 MMHG

## 2018-03-27 DIAGNOSIS — J34.2 DEVIATED NASAL SEPTUM: ICD-10-CM

## 2018-03-27 DIAGNOSIS — J35.01 TONSILLITIS, CHRONIC: Primary | ICD-10-CM

## 2018-03-27 DIAGNOSIS — G89.18 ACUTE POST-OPERATIVE PAIN: ICD-10-CM

## 2018-03-27 PROCEDURE — G8916 PT W IV AB GIVEN ON TIME: HCPCS

## 2018-03-27 PROCEDURE — 30520 REPAIR OF NASAL SEPTUM: CPT | Performed by: OTOLARYNGOLOGY

## 2018-03-27 PROCEDURE — 42821 REMOVE TONSILS AND ADENOIDS: CPT

## 2018-03-27 PROCEDURE — 42821 REMOVE TONSILS AND ADENOIDS: CPT | Performed by: OTOLARYNGOLOGY

## 2018-03-27 PROCEDURE — 30140 RESECT INFERIOR TURBINATE: CPT | Mod: 50 | Performed by: OTOLARYNGOLOGY

## 2018-03-27 PROCEDURE — G8907 PT DOC NO EVENTS ON DISCHARG: HCPCS

## 2018-03-27 PROCEDURE — 88304 TISSUE EXAM BY PATHOLOGIST: CPT | Performed by: OTOLARYNGOLOGY

## 2018-03-27 PROCEDURE — 30520 REPAIR OF NASAL SEPTUM: CPT

## 2018-03-27 PROCEDURE — 30140 RESECT INFERIOR TURBINATE: CPT | Mod: 50

## 2018-03-27 RX ORDER — LIDOCAINE 40 MG/G
CREAM TOPICAL
Status: CANCELLED | OUTPATIENT
Start: 2018-03-27

## 2018-03-27 RX ORDER — ONDANSETRON 2 MG/ML
INJECTION INTRAMUSCULAR; INTRAVENOUS PRN
Status: DISCONTINUED | OUTPATIENT
Start: 2018-03-27 | End: 2018-03-27

## 2018-03-27 RX ORDER — CEFAZOLIN SODIUM 2 G/100ML
2 INJECTION, SOLUTION INTRAVENOUS
Status: COMPLETED | OUTPATIENT
Start: 2018-03-27 | End: 2018-03-27

## 2018-03-27 RX ORDER — OXYCODONE AND ACETAMINOPHEN 7.5; 325 MG/1; MG/1
1 TABLET ORAL EVERY 4 HOURS PRN
Qty: 60 TABLET | Refills: 0 | Status: SHIPPED | OUTPATIENT
Start: 2018-03-27 | End: 2019-02-14

## 2018-03-27 RX ORDER — FENTANYL CITRATE 50 UG/ML
25-50 INJECTION, SOLUTION INTRAMUSCULAR; INTRAVENOUS
Status: DISCONTINUED | OUTPATIENT
Start: 2018-03-27 | End: 2018-03-28 | Stop reason: HOSPADM

## 2018-03-27 RX ORDER — DEXAMETHASONE SODIUM PHOSPHATE 10 MG/ML
10 INJECTION, SOLUTION INTRAMUSCULAR; INTRAVENOUS ONCE
Status: COMPLETED | OUTPATIENT
Start: 2018-03-27 | End: 2018-03-27

## 2018-03-27 RX ORDER — SODIUM CHLORIDE, SODIUM LACTATE, POTASSIUM CHLORIDE, CALCIUM CHLORIDE 600; 310; 30; 20 MG/100ML; MG/100ML; MG/100ML; MG/100ML
INJECTION, SOLUTION INTRAVENOUS CONTINUOUS
Status: DISCONTINUED | OUTPATIENT
Start: 2018-03-27 | End: 2018-03-28 | Stop reason: HOSPADM

## 2018-03-27 RX ORDER — SODIUM CHLORIDE, SODIUM LACTATE, POTASSIUM CHLORIDE, CALCIUM CHLORIDE 600; 310; 30; 20 MG/100ML; MG/100ML; MG/100ML; MG/100ML
INJECTION, SOLUTION INTRAVENOUS CONTINUOUS
Status: CANCELLED | OUTPATIENT
Start: 2018-03-27

## 2018-03-27 RX ORDER — ONDANSETRON 2 MG/ML
4 INJECTION INTRAMUSCULAR; INTRAVENOUS EVERY 30 MIN PRN
Status: DISCONTINUED | OUTPATIENT
Start: 2018-03-27 | End: 2018-03-28 | Stop reason: HOSPADM

## 2018-03-27 RX ORDER — LIDOCAINE HYDROCHLORIDE 20 MG/ML
INJECTION, SOLUTION INFILTRATION; PERINEURAL PRN
Status: DISCONTINUED | OUTPATIENT
Start: 2018-03-27 | End: 2018-03-27

## 2018-03-27 RX ORDER — GABAPENTIN 300 MG/1
300 CAPSULE ORAL ONCE
Status: COMPLETED | OUTPATIENT
Start: 2018-03-27 | End: 2018-03-27

## 2018-03-27 RX ORDER — LIDOCAINE HYDROCHLORIDE AND EPINEPHRINE 10; 10 MG/ML; UG/ML
INJECTION, SOLUTION INFILTRATION; PERINEURAL PRN
Status: DISCONTINUED | OUTPATIENT
Start: 2018-03-27 | End: 2018-03-27 | Stop reason: HOSPADM

## 2018-03-27 RX ORDER — LIDOCAINE HYDROCHLORIDE AND EPINEPHRINE BITARTRATE 20; .01 MG/ML; MG/ML
INJECTION, SOLUTION SUBCUTANEOUS PRN
Status: DISCONTINUED | OUTPATIENT
Start: 2018-03-27 | End: 2018-03-27 | Stop reason: HOSPADM

## 2018-03-27 RX ORDER — ONDANSETRON 4 MG/1
4 TABLET, ORALLY DISINTEGRATING ORAL EVERY 30 MIN PRN
Status: DISCONTINUED | OUTPATIENT
Start: 2018-03-27 | End: 2018-03-28 | Stop reason: HOSPADM

## 2018-03-27 RX ORDER — MEPERIDINE HYDROCHLORIDE 25 MG/ML
12.5 INJECTION INTRAMUSCULAR; INTRAVENOUS; SUBCUTANEOUS
Status: DISCONTINUED | OUTPATIENT
Start: 2018-03-27 | End: 2018-03-28 | Stop reason: HOSPADM

## 2018-03-27 RX ORDER — SODIUM CHLORIDE, SODIUM LACTATE, POTASSIUM CHLORIDE, CALCIUM CHLORIDE 600; 310; 30; 20 MG/100ML; MG/100ML; MG/100ML; MG/100ML
INJECTION, SOLUTION INTRAVENOUS CONTINUOUS PRN
Status: DISCONTINUED | OUTPATIENT
Start: 2018-03-27 | End: 2018-03-27

## 2018-03-27 RX ORDER — ACETAMINOPHEN 325 MG/1
975 TABLET ORAL ONCE
Status: COMPLETED | OUTPATIENT
Start: 2018-03-27 | End: 2018-03-27

## 2018-03-27 RX ORDER — PROPOFOL 10 MG/ML
INJECTION, EMULSION INTRAVENOUS CONTINUOUS PRN
Status: DISCONTINUED | OUTPATIENT
Start: 2018-03-27 | End: 2018-03-27

## 2018-03-27 RX ORDER — NALOXONE HYDROCHLORIDE 0.4 MG/ML
.1-.4 INJECTION, SOLUTION INTRAMUSCULAR; INTRAVENOUS; SUBCUTANEOUS
Status: DISCONTINUED | OUTPATIENT
Start: 2018-03-27 | End: 2018-03-28 | Stop reason: HOSPADM

## 2018-03-27 RX ORDER — CEFAZOLIN SODIUM 1 G/3ML
1 INJECTION, POWDER, FOR SOLUTION INTRAMUSCULAR; INTRAVENOUS SEE ADMIN INSTRUCTIONS
Status: DISCONTINUED | OUTPATIENT
Start: 2018-03-27 | End: 2018-03-28 | Stop reason: HOSPADM

## 2018-03-27 RX ORDER — FENTANYL CITRATE 50 UG/ML
INJECTION, SOLUTION INTRAMUSCULAR; INTRAVENOUS PRN
Status: DISCONTINUED | OUTPATIENT
Start: 2018-03-27 | End: 2018-03-27

## 2018-03-27 RX ORDER — PROPOFOL 10 MG/ML
INJECTION, EMULSION INTRAVENOUS PRN
Status: DISCONTINUED | OUTPATIENT
Start: 2018-03-27 | End: 2018-03-27

## 2018-03-27 RX ORDER — HYDROMORPHONE HYDROCHLORIDE 1 MG/ML
.3-.5 INJECTION, SOLUTION INTRAMUSCULAR; INTRAVENOUS; SUBCUTANEOUS EVERY 10 MIN PRN
Status: DISCONTINUED | OUTPATIENT
Start: 2018-03-27 | End: 2018-03-28 | Stop reason: HOSPADM

## 2018-03-27 RX ADMIN — Medication 100 MCG: at 11:01

## 2018-03-27 RX ADMIN — ONDANSETRON 4 MG: 2 INJECTION INTRAMUSCULAR; INTRAVENOUS at 11:23

## 2018-03-27 RX ADMIN — GABAPENTIN 300 MG: 300 CAPSULE ORAL at 08:43

## 2018-03-27 RX ADMIN — LIDOCAINE HYDROCHLORIDE AND EPINEPHRINE 22 ML: 10; 10 INJECTION, SOLUTION INFILTRATION; PERINEURAL at 11:28

## 2018-03-27 RX ADMIN — Medication 25 MG: at 10:07

## 2018-03-27 RX ADMIN — FENTANYL CITRATE 50 MCG: 50 INJECTION, SOLUTION INTRAMUSCULAR; INTRAVENOUS at 10:07

## 2018-03-27 RX ADMIN — Medication 100 MCG: at 11:12

## 2018-03-27 RX ADMIN — Medication 100 MCG: at 11:07

## 2018-03-27 RX ADMIN — SODIUM CHLORIDE, SODIUM LACTATE, POTASSIUM CHLORIDE, CALCIUM CHLORIDE: 600; 310; 30; 20 INJECTION, SOLUTION INTRAVENOUS at 10:04

## 2018-03-27 RX ADMIN — Medication 100 MCG: at 10:43

## 2018-03-27 RX ADMIN — LIDOCAINE HYDROCHLORIDE AND EPINEPHRINE BITARTRATE 2 ML: 20; .01 INJECTION, SOLUTION SUBCUTANEOUS at 10:15

## 2018-03-27 RX ADMIN — LIDOCAINE HYDROCHLORIDE 60 MG: 20 INJECTION, SOLUTION INFILTRATION; PERINEURAL at 10:07

## 2018-03-27 RX ADMIN — PROPOFOL 200 MCG/KG/MIN: 10 INJECTION, EMULSION INTRAVENOUS at 10:07

## 2018-03-27 RX ADMIN — FENTANYL CITRATE 50 MCG: 50 INJECTION, SOLUTION INTRAMUSCULAR; INTRAVENOUS at 10:21

## 2018-03-27 RX ADMIN — CEFAZOLIN SODIUM 2 G: 2 INJECTION, SOLUTION INTRAVENOUS at 10:15

## 2018-03-27 RX ADMIN — PROPOFOL 200 MG: 10 INJECTION, EMULSION INTRAVENOUS at 10:07

## 2018-03-27 RX ADMIN — ACETAMINOPHEN 975 MG: 325 TABLET ORAL at 08:42

## 2018-03-27 RX ADMIN — DEXAMETHASONE SODIUM PHOSPHATE 10 MG: 10 INJECTION, SOLUTION INTRAMUSCULAR; INTRAVENOUS at 10:12

## 2018-03-27 ASSESSMENT — LIFESTYLE VARIABLES: TOBACCO_USE: 1

## 2018-03-27 NOTE — ANESTHESIA PREPROCEDURE EVALUATION
Anesthesia Evaluation     .             ROS/MED HX    ENT/Pulmonary:  - neg pulmonary ROS   (+)sleep apnea, tobacco use, Current use , . .    Neurologic:  - neg neurologic ROS     Cardiovascular:  - neg cardiovascular ROS       METS/Exercise Tolerance:     Hematologic:  - neg hematologic  ROS       Musculoskeletal:  - neg musculoskeletal ROS       GI/Hepatic:  - neg GI/hepatic ROS       Renal/Genitourinary:  - ROS Renal section negative       Endo:  - neg endo ROS   (+) Obesity, .      Psychiatric:  - neg psychiatric ROS       Infectious Disease:  - neg infectious disease ROS       Malignancy:      - no malignancy   Other:    - neg other ROS                 Physical Exam  Normal systems: cardiovascular, pulmonary and dental    Airway   Mallampati: II  TM distance: >3 FB  Neck ROM: full    Dental     Cardiovascular       Pulmonary                     Anesthesia Plan      History & Physical Review  History and physical reviewed and following examination; no interval change.    ASA Status:  2 .    NPO Status:  > 8 hours    Plan for General and ETT with Intravenous induction. Maintenance will be Balanced.    PONV prophylaxis:  Ondansetron (or other 5HT-3) and Dexamethasone or Solumedrol       Postoperative Care  Postoperative pain management:  IV analgesics and Oral pain medications.      Consents  Anesthetic plan, risks, benefits and alternatives discussed with:  Patient..                          .

## 2018-03-27 NOTE — DISCHARGE INSTRUCTIONS
[facility]  Same-Day Surgery   Adult Discharge Orders & Instructions   For 24 hours after surgery  1. Get plenty of rest.  A responsible adult must stay with you for at least 24 hours after you leave the hospital.   2. Do not drive or use heavy equipment.  If you have weakness or tingling, don't drive or use heavy equipment until this feeling goes away.  3. Do not drink alcohol.  4. Avoid strenuous or risky activities.  Ask for help when climbing stairs.   5. You may feel lightheaded.  IF so, sit for a few minutes before standing.  Have someone help you get up.   6. If you have nausea (feel sick to your stomach): Drink only clear liquids such as apple juice, ginger ale, broth or 7-Up.  Rest may also help.  Be sure to drink enough fluids.  Move to a regular diet as you feel able.  7. You may have a slight fever. Call the doctor if your fever is over 100 F (37.7 C) (taken under the tongue) or lasts longer than 24 hours.  8. You may have a dry mouth, a sore throat, muscle aches or trouble sleeping.  These should go away after 24 hours.  9. Do not make important or legal decisions.   Call your doctor for any of the followin.  Signs of infection (fever, growing tenderness at the surgery site, a large amount of drainage or bleeding, severe pain, foul-smelling drainage, redness, swelling).    2. It has been over 8 to 10 hours since surgery and you are still not able to urinate (pass water).    3.  Headache for over 24 hours.    Postoperative Care for Tonsillectomy (with or without adenoidectomy)    Recovery - There are a handful of issues that routinely occur during recover that should be anticipated during your recovery.    1. The pain and swelling almost always gets worse before it gets better, this is normal.  Usually it peaks 3 to 5 days after the surgery, and then begins improving at 7 to 8 days after surgery.  Of course, this is variable from person to person.  2. The only dietary  restriction is avoidance of hard or crunchy things until I see you in follow up.  If it makes a noise when you bite it, it is too hard.  Although it is good to begin eating again from day one, it is not unusual to not eat for several days after the procedure.  The most important thing is staying hydrated.  Drink fluids with electrolytes if possible, such as sports drinks.  3. The pain medication you were sent home with can make some people very nauseated.  To minimize this, avoid taking it on an empty stomach, or take smaller does with greater frequency.  For example if your dose is 2 teaspoons every four hours, try taking one teaspoon every two hours, etc.  4. Antibiotic are sometimes given after surgery, not to prevent infection, but some research shows that it helps to decrease pain.  This is not absolutely proven, and therefore is not absolutely necessary.   5. Try to stay ahead of the pain.  In other words, do not wait for pain medication to completely wear off before taking more pain medicine.  Instead, take the medication every 4 to 6 hours, even if it requires setting an alarm clock at night.  This is especially helpful during the first 5 days.  6. The uvula ( the small hanging object in the back of your mouth) frequently swells up after tonsillectomy, but will go back to normal.  This swelling can temporarily cause the sensation of something being stuck in your throat, it will go away with recovery.  Also, because of the arrangement of nerves under where the tonsils were, sharp ear pain is very common during recovery, and will also go away with recovery.   7. With adenoidectomy, a very strong and foul-smelling odor can occur about 4-7 days after surgery.  This fades rapidly, and unless there is an associated fever no antibiotics are necessary.  8. It is very common after tonsillectomy to experience ear pain. This is due to nerves on the side of the throat becoming inflamed, and causing the perception of  sudden episodes of ear pain.  This can be controlled with the same pain medication given for the surgery.     Activity - Avoid heavy lifting (greater than 20 pounds), strenuous exercise, or extremely cold environments until the follow up appointment.  Also, try to sleep with your head elevated.  An irritated cough from the breathing tube is fairly normal after surgery.    Medications - Except blood thinners, almost all medication can be re-started after tonsillectomy.      Complications - Bleeding is by far the most common complication after tonsillectomy.  If there are a few small drops or streaks of blood in the saliva that then goes away, this can be conservatively watched.  Gentle gargling with the ice water can also help stop this minor bleeding.  However, if the bleeding is persistent, or heavy bleeding occurs, do not hesitate.  Go to the emergency room to be evaluated.    Follow up - I like to see my patients about 2 weeks after the procedure to make sure that everything is healing appropriately.  Occasionally, there can be some longer - lasting side effects of surgery such as abnormal tongue sensations, or unusual swallowing.  However, if everything is healing well, the 2 week postoperative visit is all that will be necessary.    If there are any questions or issues with the above, or if there are other issues that concern you, always feel free to call the clinic and I am happy to speak with you as soon as I can.    Demario Bashir MD   Otolaryngology  [facility]  [phone] After hours, [facility] option  Tonsillectomy and Adenoidectomy    What is a tonsillectomy and adenoidectomy?    Tonsillectomy is removal of the tonsils. Adenoidectomy is removal of the adenoids. Tonsils and adenoids are lumps of tissue at the back of the throat. The tonsils and adenoids fight infection. Your child may need the tonsillectomy if he has many bad colds, sore throats, or ear infections. Tonsillectomy  and adenoidectomy (T&A) are often done together.    What can I expect after Surgery?    It is common to have an upset stomach and vomiting during the first 24 hours after surgery.    Your child s throat may be sore for two weeks, especially when eating. The soreness may get better after a few days and then get worse again. Your child s voice may change a little after surgery.    Ear pain is common, often when swallowing, because the ear and throat have a common sensory nerve. Jaw spasms, or uncontrollable movement of the jaw, may also occur and cause pain.    Neck soreness is common after an adenoidectomy and usually last about one week.    Your child will have bad breath for a few weeks because your child s throat is swollen, snoring is common after surgery but should go away after about two weeks.    How should I care for my child?    Encourage your child to drink plenty of liquids (at least 2 -3 ounces per hour)  keeping the throat moist decreases discomfort and prevents dehydration( a  dangerous condition in which the body gets dried out.)    Give pain medication regularly within the limits directed by your doctor. Give  it before bed and first thing after waking in the morning. Try to give the   pain medication 30 minutes before meals to help make swallowing easier.    To prevent bleeding, avoid coughing, nose-blowing, clearing throat, and   spitting. Wipe nose gently if needed. When sneezing, encourage your child to   Open the mouth and make a sound, to prevent pressure buildup.    Avoid coming in contact with people who have colds, flu, or infections.      What can my child eat?    The day of surgery, give only cool, Clear liquids such as:    ? Apple Juice  ? Jell-o*  ? Fredo-aid*  ? Popsicles*  ? Flat pop (remove bubbles)  ? Water    If your child has an upset stomach, give small amounts often. Note: If   Your child vomits after drinking red liquids the vomit will be the same  color.    After the first day,  when your child wants them add dairy and soft foods such as:  ? Ice cream  ? Milk shakes(use spoon)  ? Pudding  ? Smooth yogurt  Liquids are more important than food.    Be sure your child is drinking a lot.    When your child wants them, add soft foods (foods without rough  edges). See the chart for ideas. If a food is not on the list ask yourself:    Is it easy to chew? Is it free of coarse, rough, or crispy edges?  If the answer  is yes, your child can probably eat it.    Be sure to cut foods very small and encourage your child to chew them  well. Continue the soft diet for 2 weeks after surgery Avoid citrus fruits and juices   such as orange juice and lemonade, as they may sting your child s throat. Avoid  foods that are hot in temperature or spicy hot.                               May Eat Should not eat   - Soft bread  - Soggy waffles or   Portuguese toast (no crusts).  Soaked in syrup  - Pancakes  - Scrambled or   poached egg   - Toast  - Crispy waffles  - Fried foods   - Oatmeal,or   Creamy cereal  - Soggy cold cereal  (soaked in milk   - Crunchy cold   cereal   - Soup  - Hot dogs  - Hamburgers  - Tender, moist  meat  - Pasta, noodles  - Spaghetti-Os  - Macaroni and  Cheese   - Tough, dry meat,  chicken or fish   - Milk  - Custard, pudding  - Ice cream  - Malts, shakes  - Yogurt (smooth)  - Cottage cheese   - Cookies  - Crackers  - Pretzels  - Chips  - Popcorn  - Nuts   - Sandwiches, (no crusts)  - Smooth peanut butter   and jelly  - Processed cheese  - Tuna - Grilled cheese  sandwiches   - Cooked vegetables  - Mashed potatoes - Raw vegetables   - Tomatoes   - Applesauce  - Bananas  - Canned fruits  - Watermelon with out  seeds - Citrus fruits  - Moist fresh fruits   - Juices (not citrus)  - Fredo aid  - Flat pop (no bubbles)  - Jell-O - Citrus juices  - Pop with bubbles         Instructions for Septoplasty    Recovery - Everyone recovers differently from a general anesthetic.  Symptoms such as fatigue, nausea,  lightheadedness, and sometimes a low grade fever (up to 100 degrees) are not unusual.  As your body removes the anesthetic drugs from circulation, these symptoms will resolve.  Your nose will be sore and throbbing after surgery, and you may even have symptoms similar to a sinus infection with headache, congestion, and pressure.  These will resolve with healing.  For several days you may experience bloody drainage from the nose, please use the drip pad as necessary for this.  If there is persistent bleeding, please call the office during business hours or the on call ENT physician after hours.  It is common for the front teeth to ache after septoplasty and sinus surgery.  This resolves with healing.  There are no diet restrictions after septoplasty, and you can resume your home medications.  Please blow your nose very very gently for two weeks after surgery.  Limit your activity to no strenuous activities until I see you for the first follow up visit, and sleep with your head elevated.    Medications - You were sent home with narcotic pain medication.  If you can tolerate the discomfort during your recovery by using just plain Tylenol or ibuprofen (advil), please do so.  However, do not hesitate to use the stronger pain medication if needed.  If you were sent home with an antibiotic, it is primarily used to help the healing process.  If it causes loose bowel movements or other signs of intolerance, it is appropriate to discontinue it.      Complications - Problems related to septoplasty almost always are detected during the operation, and special instruction will be given in that situation.  However, unexpected things can happen.  If you experience persistent bleeding, fevers, changes in vision, and severe headaches, this may be the sign of an infection.  Any of these symptoms should be called into my office or to the on call ENT if after hours.   The most common non-emergency distant complication of septoplasty is  the septum healing crooked.  Although rare, this can happen.  There may be small plastic pieces placed inside your nose during surgery (splints).  These help to promote the septum into healing in its straightened position, and will be removed at the follow up visit.    Follow up - As mentioned, splints may be removed at the follow up visit.  This is not as bad as it sounds.  And afterwards, the improvement you will expereince in breathing from the septoplasty is usually dramatic and immediate.  I will then see you 4 to 6 weeks after that visit to make sure that everything has healed appropriately.    If there are any questions or issues with the above, or if there are other issues that concern you, always feel free to call the clinic and I am happy to speak with you as soon as I can.    Demario Bashir MD  Otolaryngology  [facility]  [phone] After hours, [facility] option

## 2018-03-27 NOTE — PROGRESS NOTES
Pt somnolent upon arrival to PACU. Needed Jaw thrust to help with airway management. Oral Airway in place upon arrival to PACU.  Jaw Thrust needed off and on for about 25 minutes in PACU.    Discharge instructions reviewed with wife, Elizabet, with .  Pt comfortable --denies pain when asked.    Wife picked up prescriptions.    Pt with some bloody sputum when getting dressed. Small amount of bloody discharge from nose. Nasal sling placed--Elizabet shown how to change.    Upon transfer to car, brother here, asking questions. Wanting to know why he could not stay longer.  Informed brother that patient stated he was ready to go home.  Reviewed with brother about complications and what to do if bleeding to occur.  Instructed to go to ED at [facility].  Brother wondering why he could not stay overnight here--explained to brother that this location is only open during the day.

## 2018-03-27 NOTE — IP AVS SNAPSHOT
[facility] [place] [facility]    [address] [place] [address]    Phone:  [phone]                                       After Visit Summary   3/27/2018    Zoran Dixon    MRN: [id number]           After Visit Summary Signature Page     I have received my discharge instructions, and my questions have been answered. I have discussed any challenges I see with this plan with the nurse or doctor.    ..........................................................................................................................................  Patient/Patient Representative Signature      ..........................................................................................................................................  Patient Representative Print Name and Relationship to Patient    ..................................................               ................................................  Date                                            Time    ..........................................................................................................................................  Reviewed by Signature/Title    ...................................................              ..............................................  Date                                                            Time

## 2018-03-27 NOTE — ANESTHESIA POSTPROCEDURE EVALUATION
Patient: Zoran Dixon    Procedure(s):  Bilateral tonsillectomy, adenoidectomy, Uvulectomy, Endoscopic septoplasty with turbinate reduction - Wound Class: II-Clean Contaminated   - Wound Class: II-Clean Contaminated    Diagnosis:Tonsil hypertrophy, deviated nasal septum  Diagnosis Additional Information: No value filed.    Anesthesia Type:  General, ETT    Note:  Anesthesia Post Evaluation    Patient location during evaluation: PACU  Patient participation: Able to fully participate in evaluation  Level of consciousness: awake  Pain management: adequate  Airway patency: patent  Cardiovascular status: acceptable  Respiratory status: acceptable  Hydration status: balanced  PONV: none     Anesthetic complications: None          Last vitals:  Vitals:    03/27/18 1245 03/27/18 1255 03/27/18 1258   BP: (!) 123/99 (!) 139/95    Pulse:      Resp: 12     Temp:   36.4  C (97.6  F)   SpO2:  95% 98%         Electronically Signed By: Angel Leyva MD  March 27, 2018  4:21 PM

## 2018-03-27 NOTE — OP NOTE
PREOPERATIVE DIAGNOSES:   1. Deviated nasal septum.   2. Turbinate hypertrophy.   3. Nasal obstruction.   4. Chronic tonsillitis  POSTOPERATIVE DIAGNOSES:   1. Deviated nasal septum.   2. Turbinate hypertrophy.   3. Nasal obstruction.   4 Chronic tonsillitis    PROCEDURES PERFORMED:   1. Endoscopically assisted septoplasty with reimplantation of cartilage.   2. Bilateral submucous resection of inferior turbinates.   3. Bilateral tonsillectomy and adenoidectomy  SURGEON: Jesse Bashir MD   ASSISTANT: None  BLOOD LOSS: 10 mL.   IMPLANTS: [place] Splints  COMPLICATIONS: None.   SPECIMENS: None.   ANESTHESIA: GETA.   INDICATIONS: Zoran Dixon  presented to me with a lifelong history of chronic nasal obstruction and chronic tonsillitis.  On evaluation, the patient had severely deviated septum and turbinate hypertrophy, as well as chronic tonsillitis and dramatic tonsillar hypertrophy. Therefore, my recommendation was for the above-named procedures. Preoperatively, risks discussed included the risks of infection, bleeding, the risks of general anesthesia, possible injury to the eyes, base of skull and tear duct system, and possible failure of the surgery to achieve the desired result. The patient understood these risks and possible outcomes and wished to proceed.   OPERATIVE PROCEDURE: After being taken to the operating room and induction of general endotracheal tube anesthesia, the bed was rotated 90 degrees.  After that, he was prepped and draped in the normal clean fashion. I began by applying topical anesthetic in the form of 2 cottonoids on each side of the nose which had been soaked with a total of 4 mL of 4% liquid cocaine. In addition, I injected 0.25% Marcaine with 1:100,000 epinephrine into the base of the columella as well as the anterior insertion of the inferior turbinates bilaterally in preparation for later submucous resection.   After the nose was prepped, I started with the tonsils.  A shoulder roll and head  turban were placed. I suspended the patient from the [place] stand using a Ken-Willy mouthgag, and I grasped the right tonsil with an Allis forceps and retracted medially and performed subcapsular dissection utilizing monopolar cautery, and the right tonsil came out very smoothly. I then turned my attention to the left side, once again using an Allis forceps to grasp it and retract it medially, and then I performed subcapsular dissection, and the left tonsil also came out very smoothly. I released the mouthgag for 2 minutes to allow recirculation of blood to the tongue.   I resuspended the patient from the [place] stand using a Ken-Willy mouthgag, and then slipped a small soft catheter through the right nasal cavity out of the mouth to retract the soft palate forward. After I did this, I inspected the nasopharynx. The patient had tremendous amounts of adenoid tissue completely filling the nasopharynx. Therefore, using a suction cautery performed adenoidectomy by cauterizing the adenoid tissue and suctioning away the fulgurated material.  I slowly made my way up the back wall of the nasopharynx until I reached the posterior nasal choanae bilaterally. The adenoid tissue was large enough that it was protruding into the posterior nasal cavity, and all of this was tediously suctioned posteriorly and cauterized away. Eventually I completely cleared the posterior nasal choanae bilaterally and had an unobstructed view of the posterior nasal cavity, and the adenoidectomy was complete. I removed the catheter from the mouth and reinspected the tonsil beds and there was good hemostasis. However, I noted that with a thorough removal of the tonsils, the uvula was undermined and already turning dusky.  Therefore I amputated it and oversewed the stump with 3-0 vicryl.  I applied a thin film of the hemostatic powder to the tonsil beds bilaterally and removed the mouthgag. The tonsils and adenoids were complete, and we moved on to the  nose.    I removed the cottonoids from the right side of the nose and entered the right nasal cavity.   I made a septoplasty incision in the right nasal vestibule in a traditional open fashion. I then dissected down onto the left side of the cartilaginous septum through the right-sided incision. I then was able to start a mucoperichondrial pocket directly on the left side of the cartilaginous septum and inserted 0-degree endoscope to form my pocket and under direct endoscopic visualization. After I completely elevated the mucoperichondrium off the left side of the septum, I then made a hemitransfixion incision through the cartilage approximately 1.5 cm back from the anterior edge. I broke over to the right side and raised a submucoperiosteal flap on the entire right side of the nasal septum under direct endoscopic visualization. I was able to carefully tease the mucoperichondrium off the large rightward-pointing spur. After this was done, I used a swivel knife to remove the entire rhomboid portion of the cartilaginous septum, and I removed it in one large piece. It was brought to the back table and crushed in 2 pieces and then reinserted back into the mucoperichondrial pocket. I laid the flaps back together and this significantly improved the nasal airway. I then closed my septoplasty incision with 3 simple interrupted 4-0 chromic gut sutures.     I proceeded to the final components of the operation, which was submucous resection of inferior turbinates. I switched to a 2 inferior turbinate blade and started on the left side. I made a stab incision at the anterior insertion of the left inferior turbinate and raised a submucoperiosteal tunnel along the medial surface of the left inferior turbinate bone. I then slowly withdrew the shaver blade as I ran the shaver to perform my submucous resection.   I then performed the same procedure on the right side, once again using the 2 mm turbinate blade to make a stab incision  at the anterior insertion of the right inferior turbinate blade. I raised a submucoperiosteal tunnel along the entire medial surface of the right inferior turbinate bone and slowly withdrew the shaver as I ran the shaver function to perform submucous resection.   At this point, the entire procedure was now complete. I reinspected both sides of the nose and there was good hemostasis.  All instruments were accounted for and all counts were correct. The patient's bed was rotated 90 degrees back to the care of anesthesia. He was awakened, extubated and sent to the recovery room in good condition.

## 2018-03-27 NOTE — TELEPHONE ENCOUNTER
Reason for Call:  Other call back  -  lidocaine, viscous, (XYLOCAINE) 2 % solution    Detailed comments: Pharmacy requesting clarification of dosage instructions.    Phone Number Patient can be reached at: Other phone number:  [phone]    Best Time: ASAP    Can we leave a detailed message on this number? YES    Call taken on 3/27/2018 at 9:03 AM by Nella Palomo

## 2018-03-27 NOTE — IP AVS SNAPSHOT
MRN:[id number]                      After Visit Summary   3/27/2018    Zoran Dixon    MRN: [id number]           Thank you!     Thank you for choosing [facility] for your care. Our goal is always to provide you with excellent care. Hearing back from our patients is one way we can continue to improve our services. Please take a few minutes to complete the written survey that you may receive in the mail after you visit with us. Thank you!        Patient Information     Date Of Birth          1985        About your hospital stay     You were admitted on:  March 27, 2018 You last received care in the:  [facility] [place] [facility]    You were discharged on:  March 27, 2018       Who to Call     For medical emergencies, please call 911.  For non-urgent questions about your medical care, please call your primary care provider or clinic, [phone]  For questions related to your surgery, please call your surgery clinic        Attending Provider     Provider Specialty    Jesse Bashir MD Otolaryngology       Primary Care Provider Office Phone # Fax #    [facility] [phone]      Your next 10 appointments already scheduled     Apr 17, 2018  8:30 AM CDT   Post Op with Jesse Bashir MD   [facility] ([facility])    [address] [place] [id number] [phone]              Further instructions from your care team       [facility]  Same-Day Surgery   Adult Discharge Orders & Instructions   For 24 hours after surgery  1. Get plenty of rest.  A responsible adult must stay with you for at least 24 hours after you leave the hospital.   2. Do not drive or use heavy equipment.  If you have weakness or tingling, don't drive or use heavy equipment until this feeling goes away.  3. Do not drink alcohol.  4. Avoid strenuous or risky activities.  Ask for help when climbing stairs.    5. You may feel lightheaded.  IF so, sit for a few minutes before standing.  Have someone help you get up.   6. If you have nausea (feel sick to your stomach): Drink only clear liquids such as apple juice, ginger ale, broth or 7-Up.  Rest may also help.  Be sure to drink enough fluids.  Move to a regular diet as you feel able.  7. You may have a slight fever. Call the doctor if your fever is over 100 F (37.7 C) (taken under the tongue) or lasts longer than 24 hours.  8. You may have a dry mouth, a sore throat, muscle aches or trouble sleeping.  These should go away after 24 hours.  9. Do not make important or legal decisions.   Call your doctor for any of the followin.  Signs of infection (fever, growing tenderness at the surgery site, a large amount of drainage or bleeding, severe pain, foul-smelling drainage, redness, swelling).    2. It has been over 8 to 10 hours since surgery and you are still not able to urinate (pass water).    3.  Headache for over 24 hours.    Postoperative Care for Tonsillectomy (with or without adenoidectomy)    Recovery - There are a handful of issues that routinely occur during recover that should be anticipated during your recovery.    1. The pain and swelling almost always gets worse before it gets better, this is normal.  Usually it peaks 3 to 5 days after the surgery, and then begins improving at 7 to 8 days after surgery.  Of course, this is variable from person to person.  2. The only dietary restriction is avoidance of hard or crunchy things until I see you in follow up.  If it makes a noise when you bite it, it is too hard.  Although it is good to begin eating again from day one, it is not unusual to not eat for several days after the procedure.  The most important thing is staying hydrated.  Drink fluids with electrolytes if possible, such as sports drinks.  3. The pain medication you were sent home with can make some people very nauseated.  To minimize this, avoid  taking it on an empty stomach, or take smaller does with greater frequency.  For example if your dose is 2 teaspoons every four hours, try taking one teaspoon every two hours, etc.  4. Antibiotic are sometimes given after surgery, not to prevent infection, but some research shows that it helps to decrease pain.  This is not absolutely proven, and therefore is not absolutely necessary.   5. Try to stay ahead of the pain.  In other words, do not wait for pain medication to completely wear off before taking more pain medicine.  Instead, take the medication every 4 to 6 hours, even if it requires setting an alarm clock at night.  This is especially helpful during the first 5 days.  6. The uvula ( the small hanging object in the back of your mouth) frequently swells up after tonsillectomy, but will go back to normal.  This swelling can temporarily cause the sensation of something being stuck in your throat, it will go away with recovery.  Also, because of the arrangement of nerves under where the tonsils were, sharp ear pain is very common during recovery, and will also go away with recovery.   7. With adenoidectomy, a very strong and foul-smelling odor can occur about 4-7 days after surgery.  This fades rapidly, and unless there is an associated fever no antibiotics are necessary.  8. It is very common after tonsillectomy to experience ear pain. This is due to nerves on the side of the throat becoming inflamed, and causing the perception of sudden episodes of ear pain.  This can be controlled with the same pain medication given for the surgery.     Activity - Avoid heavy lifting (greater than 20 pounds), strenuous exercise, or extremely cold environments until the follow up appointment.  Also, try to sleep with your head elevated.  An irritated cough from the breathing tube is fairly normal after surgery.    Medications - Except blood thinners, almost all medication can be re-started after tonsillectomy.       Complications - Bleeding is by far the most common complication after tonsillectomy.  If there are a few small drops or streaks of blood in the saliva that then goes away, this can be conservatively watched.  Gentle gargling with the ice water can also help stop this minor bleeding.  However, if the bleeding is persistent, or heavy bleeding occurs, do not hesitate.  Go to the emergency room to be evaluated.    Follow up - I like to see my patients about 2 weeks after the procedure to make sure that everything is healing appropriately.  Occasionally, there can be some longer - lasting side effects of surgery such as abnormal tongue sensations, or unusual swallowing.  However, if everything is healing well, the 2 week postoperative visit is all that will be necessary.    If there are any questions or issues with the above, or if there are other issues that concern you, always feel free to call the clinic and I am happy to speak with you as soon as I can.    Demario Bashir MD   Otolaryngology  [facility]  [phone] After hours, [facility] option  Tonsillectomy and Adenoidectomy    What is a tonsillectomy and adenoidectomy?    Tonsillectomy is removal of the tonsils. Adenoidectomy is removal of the adenoids. Tonsils and adenoids are lumps of tissue at the back of the throat. The tonsils and adenoids fight infection. Your child may need the tonsillectomy if he has many bad colds, sore throats, or ear infections. Tonsillectomy and adenoidectomy (T&A) are often done together.    What can I expect after Surgery?    It is common to have an upset stomach and vomiting during the first 24 hours after surgery.    Your child s throat may be sore for two weeks, especially when eating. The soreness may get better after a few days and then get worse again. Your child s voice may change a little after surgery.    Ear pain is common, often when swallowing, because the ear and throat have a common sensory  nerve. Jaw spasms, or uncontrollable movement of the jaw, may also occur and cause pain.    Neck soreness is common after an adenoidectomy and usually last about one week.    Your child will have bad breath for a few weeks because your child s throat is swollen, snoring is common after surgery but should go away after about two weeks.    How should I care for my child?    Encourage your child to drink plenty of liquids (at least 2 -3 ounces per hour)  keeping the throat moist decreases discomfort and prevents dehydration( a  dangerous condition in which the body gets dried out.)    Give pain medication regularly within the limits directed by your doctor. Give  it before bed and first thing after waking in the morning. Try to give the   pain medication 30 minutes before meals to help make swallowing easier.    To prevent bleeding, avoid coughing, nose-blowing, clearing throat, and   spitting. Wipe nose gently if needed. When sneezing, encourage your child to   Open the mouth and make a sound, to prevent pressure buildup.    Avoid coming in contact with people who have colds, flu, or infections.      What can my child eat?    The day of surgery, give only cool, Clear liquids such as:    ? Apple Juice  ? Jell-o*  ? Fredo-aid*  ? Popsicles*  ? Flat pop (remove bubbles)  ? Water    If your child has an upset stomach, give small amounts often. Note: If   Your child vomits after drinking red liquids the vomit will be the same  color.    After the first day, when your child wants them add dairy and soft foods such as:  ? Ice cream  ? Milk shakes(use spoon)  ? Pudding  ? Smooth yogurt  Liquids are more important than food.    Be sure your child is drinking a lot.    When your child wants them, add soft foods (foods without rough  edges). See the chart for ideas. If a food is not on the list ask yourself:    Is it easy to chew? Is it free of coarse, rough, or crispy edges?  If the answer  is yes, your child can probably eat  it.    Be sure to cut foods very small and encourage your child to chew them  well. Continue the soft diet for 2 weeks after surgery Avoid citrus fruits and juices   such as orange juice and lemonade, as they may sting your child s throat. Avoid  foods that are hot in temperature or spicy hot.                               May Eat Should not eat   - Soft bread  - Soggy waffles or   Amharic toast (no crusts).  Soaked in syrup  - Pancakes  - Scrambled or   poached egg   - Toast  - Crispy waffles  - Fried foods   - Oatmeal,or   Creamy cereal  - Soggy cold cereal  (soaked in milk   - Crunchy cold   cereal   - Soup  - Hot dogs  - Hamburgers  - Tender, moist  meat  - Pasta, noodles  - Spaghetti-Os  - Macaroni and  Cheese   - Tough, dry meat,  chicken or fish   - Milk  - Custard, pudding  - Ice cream  - Malts, shakes  - Yogurt (smooth)  - Cottage cheese   - Cookies  - Crackers  - Pretzels  - Chips  - Popcorn  - Nuts   - Sandwiches, (no crusts)  - Smooth peanut butter   and jelly  - Processed cheese  - Tuna - Grilled cheese  sandwiches   - Cooked vegetables  - Mashed potatoes - Raw vegetables   - Tomatoes   - Applesauce  - Bananas  - Canned fruits  - Watermelon with out  seeds - Citrus fruits  - Moist fresh fruits   - Juices (not citrus)  - Fredo aid  - Flat pop (no bubbles)  - Jell-O - Citrus juices  - Pop with bubbles         Instructions for Septoplasty    Recovery - Everyone recovers differently from a general anesthetic.  Symptoms such as fatigue, nausea, lightheadedness, and sometimes a low grade fever (up to 100 degrees) are not unusual.  As your body removes the anesthetic drugs from circulation, these symptoms will resolve.  Your nose will be sore and throbbing after surgery, and you may even have symptoms similar to a sinus infection with headache, congestion, and pressure.  These will resolve with healing.  For several days you may experience bloody drainage from the nose, please use the drip pad as necessary for  this.  If there is persistent bleeding, please call the office during business hours or the on call ENT physician after hours.  It is common for the front teeth to ache after septoplasty and sinus surgery.  This resolves with healing.  There are no diet restrictions after septoplasty, and you can resume your home medications.  Please blow your nose very very gently for two weeks after surgery.  Limit your activity to no strenuous activities until I see you for the first follow up visit, and sleep with your head elevated.    Medications - You were sent home with narcotic pain medication.  If you can tolerate the discomfort during your recovery by using just plain Tylenol or ibuprofen (advil), please do so.  However, do not hesitate to use the stronger pain medication if needed.  If you were sent home with an antibiotic, it is primarily used to help the healing process.  If it causes loose bowel movements or other signs of intolerance, it is appropriate to discontinue it.      Complications - Problems related to septoplasty almost always are detected during the operation, and special instruction will be given in that situation.  However, unexpected things can happen.  If you experience persistent bleeding, fevers, changes in vision, and severe headaches, this may be the sign of an infection.  Any of these symptoms should be called into my office or to the on call ENT if after hours.   The most common non-emergency distant complication of septoplasty is the septum healing crooked.  Although rare, this can happen.  There may be small plastic pieces placed inside your nose during surgery (splints).  These help to promote the septum into healing in its straightened position, and will be removed at the follow up visit.    Follow up - As mentioned, splints may be removed at the follow up visit.  This is not as bad as it sounds.  And afterwards, the improvement you will expereince in breathing from the septoplasty is usually  "dramatic and immediate.  I will then see you 4 to 6 weeks after that visit to make sure that everything has healed appropriately.    If there are any questions or issues with the above, or if there are other issues that concern you, always feel free to call the clinic and I am happy to speak with you as soon as I can.    Demario Bashir MD  Otolaryngology  [facility]  [phone] After hours, [facility] option    Pending Results     Date and Time Order Name Status Description    3/27/2018 1026 Surgical pathology exam In process             Admission Information     Date & Time Provider Department Dept. Phone    3/27/2018 Jesse Bashir MD [facility] [phone]      Your Vitals Were     Blood Pressure Pulse Temperature Respirations Height Weight    112/72 91 97.6  F (36.4  C) (Temporal) 15 1.702 m (5' 7\") 95.3 kg (210 lb)    Pulse Oximetry BMI (Body Mass Index)                95% 32.89 kg/m2          MyChart Information     [facility] lets you send messages to your doctor, view your test results, renew your prescriptions, schedule appointments and more. To sign up, go to [email or web address] . Click on \"Log in\" on the left side of the screen, which will take you to the Welcome page. Then click on \"Sign up Now\" on the right side of the page.     You will be asked to enter the access code listed below, as well as some personal information. Please follow the directions to create your username and password.     Your access code is: [email or web address]  Expires: 2018  9:20 AM     Your access code will  in 90 days. If you need help or a new code, please call your [facility] clinic or [phone].        Care EveryWhere ID     This is your Care EveryWhere ID. This could be used by other organizations to access your [facility] medical records  [id number]        Equal Access to Services     CHERI STRAUSS AH: Clara Ga, ursula bush, chito kirk " jeannie chancodey fox'aan ah. So [facility] [phone].    ATENCIÓN: Si torito robertson, tiene a prado disposición servicios gratuitos de asistencia lingüística. Casa al [phone].    We comply with applicable federal civil rights laws and [place] laws. We do not discriminate on the basis of race, color, national origin, age, disability, sex, sexual orientation, or gender identity.               Review of your medicines      START taking        Dose / Directions    amoxicillin-clavulanate 875-125 MG per tablet   Commonly known as:  AUGMENTIN   Used for:  Tonsillitis, chronic, Acute post-operative pain, Deviated nasal septum        Dose:  1 tablet   Take 1 tablet by mouth 2 times daily for 7 days   Quantity:  14 tablet   Refills:  0       lidocaine (viscous) 2 % solution   Commonly known as:  XYLOCAINE   Used for:  Tonsillitis, chronic, Acute post-operative pain, Deviated nasal septum        Dose:  15 mL   Take 15 mLs by mouth every 2 hours as needed for moderate pain swish and spit every 3-8 hours as needed; max 8 doses/24 hour period   Quantity:  300 mL   Refills:  1       oxyCODONE-acetaminophen 7.5-325 MG per tablet   Commonly known as:  PERCOCET   Used for:  Tonsillitis, chronic, Acute post-operative pain, Deviated nasal septum        Dose:  1 tablet   Take 1 tablet by mouth every 4 hours as needed for pain maximum 6 tablet(s) per day   Quantity:  60 tablet   Refills:  0            Where to get your medicines      These medications were sent to [facility] - [place], [place] - [address], [address]  [address], [address], [facility] [address]     Phone:  [phone]     lidocaine (viscous) 2 % solution         Some of these will need a paper prescription and others can be bought over the counter. Ask your nurse if you have questions.     Bring a paper prescription for each of these medications     amoxicillin-clavulanate 875-125 MG per tablet     oxyCODONE-acetaminophen 7.5-325 MG per tablet                Protect others around you: Learn how to safely use, store and throw away your medicines at www.disposemymeds.org.        ANTIBIOTIC INSTRUCTION     You've Been Prescribed an Antibiotic - Now What?  Your healthcare team thinks that you or your loved one might have an infection. Some infections can be treated with antibiotics, which are powerful, life-saving drugs. Like all medications, antibiotics have side effects and should only be used when necessary. There are some important things you should know about your antibiotic treatment.      Your healthcare team may run tests before you start taking an antibiotic.    Your team may take samples (e.g., from your blood, urine or other areas) to run tests to look for bacteria. These test can be important to determine if you need an antibiotic at all and, if you do, which antibiotic will work best.      Within a few days, your healthcare team might change or even stop your antibiotic.    Your team may start you on an antibiotic while they are working to find out what is making you sick.    Your team might change your antibiotic because test results show that a different antibiotic would be better to treat your infection.    In some cases, once your team has more information, they learn that you do not need an antibiotic at all. They may find out that you don't have an infection, or that the antibiotic you're taking won't work against your infection. For example, an infection caused by a virus can't be treated with antibiotics. Staying on an antibiotic when you don't need it is more likely to be harmful than helpful.      You may experience side effects from your antibiotic.    Like all medications, antibiotics have side effects. Some of these can be serious.    Let you healthcare team know if you have any known allergies when you are admitted to the hospital.    One significant side effect of nearly all antibiotics  is the risk of severe and sometimes deadly diarrhea caused by Clostridium difficile (C. Difficile). This occurs when a person takes antibiotics because some good germs are destroyed. Antibiotic use allows C. diificile to take over, putting patients at high risk for this serious infection.    As a patient or caregiver, it is important to understand your or your loved one's antibiotic treatment. It is especially important for caregivers to speak up when patients can't speak for themselves. Here are some important questions to ask your healthcare team.    What infection is this antibiotic treating and how do you know I have that infection?    What side effects might occur from this antibiotic?    How long will I need to take this antibiotic?    Is it safe to take this antibiotic with other medications or supplements (e.g., vitamins) that I am taking?     Are there any special directions I need to know about taking this antibiotic? For example, should I take it with food?    How will I be monitored to know whether my infection is responding to the antibiotic?    What tests may help to make sure the right antibiotic is prescribed for me?      Information provided by:  www.cdc.gov/getsmart  U.S. Department of Health and Human Services  Centers for disease Control and Prevention  National Center for Emerging and Zoonotic Infectious Diseases  Division of Healthcare Quality Promotion        Information about OPIOIDS     PRESCRIPTION OPIOIDS: WHAT YOU NEED TO KNOW    Prescription opioids can be used to help relieve moderate to severe pain and are often prescribed following a surgery or injury, or for certain health conditions. These medications can be an important part of treatment but also come with serious risks. It is important to work with your health care provider to make sure you are getting the safest, most effective care.    WHAT ARE THE RISKS AND SIDE EFFECTS OF OPIOID USE?  Prescription opioids carry serious risks  of addiction and overdose, especially with prolonged use. An opioid overdose, often marked by slowed breathing can cause sudden death. The use of prescription opioids can have a number of side effects as well, even when taken as directed:      Tolerance - meaning you might need to take more of a medication for the same pain relief    Physical dependence - meaning you have symptoms of withdrawal when a medication is stopped    Increased sensitivity to pain    Constipation    Nausea, vomiting, and dry mouth    Sleepiness and dizziness    Confusion    Depression    Low levels of testosterone that can result in lower sex drive, energy, and strength    Itching and sweating    RISKS ARE GREATER WITH:    History of drug misuse, substance use disorder, or overdose    Mental health conditions (such as depression or anxiety)    Sleep apnea    Older age (65 years or older)    Pregnancy    Avoid alcohol while taking prescription opioids.   Also, unless specifically advised by your health care provider, medications to avoid include:    Benzodiazepines (such as Xanax or Valium)    Muscle relaxants (such as Soma or Flexeril)    Hypnotics (such as Ambien or Lunesta)    Other prescription opioids    KNOW YOUR OPTIONS:  Talk to your health care provider about ways to manage your pain that do not involve prescription opioids. Some of these options may actually work better and have fewer risks and side effects:    Pain relievers such as acetaminophen, ibuprofen, and naproxen    Some medications that are also used for depression or seizures    Physical therapy and exercise    Cognitive behavioral therapy, a psychological, goal-directed approach, in which patients learn how to modify physical, behavioral, and emotional triggers of pain and stress    IF YOU ARE PRESCRIBED OPIOIDS FOR PAIN:    Never take opioids in greater amounts or more often than prescribed    Follow up with your primary health care provider and work together to create  a plan on how to manage your pain.    Talk about ways to help manage your pain that do not involve prescription opioids    Talk about all concerns and side effects    Help prevent misuse and abuse    Never sell or share prescription opioids    Never use another person's prescription opioids    Store prescription opioids in a secure place and out of reach of others (this may include visitors, children, friends, and family)    Visit www.cdc.gov/drugoverdose to learn about risks of opioid abuse and overdose    If you believe you may be struggling with addiction, tell your health care provider and ask for guidance or call [facility]'s National Helpline at [phone]-HELP    LEARN MORE / www.cdc.gov/drugoverdose/prescribing/guideline.html    Safely dispose of unused prescription opioids: Find your local drug take-back programs and more information about the importance of safe disposal at www.doseofreality.[place].gov             Medication List: This is a list of all your medications and when to take them. Check marks below indicate your daily home schedule. Keep this list as a reference.      Medications           Morning Afternoon Evening Bedtime As Needed    amoxicillin-clavulanate 875-125 MG per tablet   Commonly known as:  AUGMENTIN   Take 1 tablet by mouth 2 times daily for 7 days                                lidocaine (viscous) 2 % solution   Commonly known as:  XYLOCAINE   Take 15 mLs by mouth every 2 hours as needed for moderate pain swish and spit every 3-8 hours as needed; max 8 doses/24 hour period                                oxyCODONE-acetaminophen 7.5-325 MG per tablet   Commonly known as:  PERCOCET   Take 1 tablet by mouth every 4 hours as needed for pain maximum 6 tablet(s) per day

## 2018-03-28 ENCOUNTER — TELEPHONE (OUTPATIENT)
Dept: OTOLARYNGOLOGY | Facility: CLINIC | Age: 33
End: 2018-03-28

## 2018-03-28 LAB — COPATH REPORT: NORMAL

## 2018-03-28 NOTE — TELEPHONE ENCOUNTER
Called and spoke with patient via interpretor services  He reports he is very congested and has a lot of nasal crusting and mucus in his nose. His symptoms started last night. RN advised him to try using a saline nasal rinse to help clear out some of the crusting. Routed to provider for further review and recommendations.    Dewey Saunders RN....3/28/2018 10:41 AM

## 2018-03-28 NOTE — TELEPHONE ENCOUNTER
..Reason for Call:  follow up after tonsillectomy/adnoidectomy (3-27)    Detailed comments: Patient's spouse is calling as Patient is having some difficulties breathing especially when tries to sleep, feels very congested; Needs to  discuss the medical leave from work, is only off this week,  may need to take next week   Requesting medication to aide with the congestion    [facility] [phone]    Phone Number Patient can be reached at:   phone number:  [phone] with     Best Time: anytime    Can we leave a detailed message on this number? YES    Call taken on 3/28/2018 at 8:24 AM by Keira Mckeon

## 2018-03-28 NOTE — TELEPHONE ENCOUNTER
Jun Jiménez  Welcome to the team.  So after a septoplasty, in order to keep the straightened septum stable and in the midline, we almost always have to put splints in on both sides.  They are made of soft plastic, but are still highly obstructive.    Please let him know he is free to use some saline rinses or saline spray, but there will always be some obstruction until I take the splints out.  He should have an appointment with me on Tuesday or Monday to have that done.    Thanks,   Demario Bashir

## 2018-04-03 ENCOUNTER — OFFICE VISIT (OUTPATIENT)
Dept: OTOLARYNGOLOGY | Facility: CLINIC | Age: 33
End: 2018-04-03
Payer: COMMERCIAL

## 2018-04-03 VITALS
RESPIRATION RATE: 16 BRPM | HEART RATE: 94 BPM | BODY MASS INDEX: 32.96 KG/M2 | OXYGEN SATURATION: 94 % | DIASTOLIC BLOOD PRESSURE: 88 MMHG | HEIGHT: 67 IN | WEIGHT: 210 LBS | SYSTOLIC BLOOD PRESSURE: 138 MMHG

## 2018-04-03 DIAGNOSIS — G89.18 ACUTE POST-OPERATIVE PAIN: ICD-10-CM

## 2018-04-03 DIAGNOSIS — J34.2 DEVIATED NASAL SEPTUM: Primary | ICD-10-CM

## 2018-04-03 PROCEDURE — 99024 POSTOP FOLLOW-UP VISIT: CPT | Performed by: OTOLARYNGOLOGY

## 2018-04-03 RX ORDER — OXYCODONE HYDROCHLORIDE 5 MG/1
5 TABLET ORAL EVERY 4 HOURS PRN
Qty: 50 TABLET | Refills: 0 | Status: SHIPPED | OUTPATIENT
Start: 2018-04-03 | End: 2019-02-14

## 2018-04-03 NOTE — LETTER
4/3/2018         RE: Zoran Dixon  [address]  [place] [address]        Dear Colleague,    Thank you for referring your patient, Zoran Dixon, to the [facility]. Please see a copy of my visit note below.    History of Present Illness - Zoran Dixon is a 32 year old male who is status post tonsillectomy and septoplasty on 3/27/18.  The patient tells me that other than the sense of congestion, they have had no problems.  No headaches, fevers, chills, or change in vision.    B/P: Data Unavailable, Temperature: Data Unavailable, Pulse: Data Unavailable, Respirations: Data Unavailable  General - The patient is well nourished and well developed, and appears to have good nutritional status.  Alert and oriented to person and place, answers questions and cooperates with examination appropriately.   Head and Face - Normocephalic and atraumatic, with no gross asymmetry noted of the contour of the facial features.  The facial nerve is intact, with strong symmetric movements.  Eyes - Extraocular movements intact, and the pupils were reactive to light.  Sclera were not icteric or injected, conjunctiva were pink and moist.  Mouth - Examination of the oral cavity shows pink, healthy, moist mucosa.  No lesions or ulceration noted.  The dentition are in good repair.  The tongue is mobile and midline.  Nose - After removing the splints and debris, everything looks great.  The incision is well healed and the turbinates are well lateralized. No sign of synechiae or infection.    A/P - Zoran Dixon has had a nice result from septoplasty.  The position of the septum and nasal tip look good.  I will see the patient in one month for another follow up.  I cautioned them to avoid any trauma to the nose, hard blowing, or twisting.      Again, thank you for allowing me to participate in the care of your patient.        Sincerely,        Jesse Bashir MD

## 2018-04-03 NOTE — MR AVS SNAPSHOT
After Visit Summary   4/3/2018    Zoran Dixon    MRN: [id number]           Patient Information     Date Of Birth          1985        Visit Information        Provider Department      4/3/2018 7:45 AM Jesse Bashir MD; [employer] [facility]        Today's Diagnoses     Deviated nasal septum    -  1    Acute post-operative pain          Care Instructions    Scheduling Information  To schedule your CT/MRI scan, please contact [facility] at [phone] OR [facility] at [phone]    To schedule your Surgery, please contact our Specialty Schedulers at [phone]      ENT Clinic Locations Clinic Hours Telephone Number     [facility]  [address]  [place] [address]   Monday:           1:00pm -- 5:00pm    Friday:              8:00am - 12:00pm   To schedule/reschedule an appointment with   Dr. Bashir,   please contact our   Specialty Scheduling Department at:     [phone]       [facility]  [address] [place] [address] Tuesday:          8:00am -- 2:00pm         Urgent Care Locations Clinic Hours Telephone Numbers     [facility]  [address] [place] [address]     Monday-Friday:     11:00am - 9:00pm    Saturday-Sunday:  9:00am - 5:00pm   [phone]     [facility]  [address] [place] [address]. [place] [address]     Monday-Friday:      5:00pm - 9:00pm     Saturday-Sunday:  9:00am - 5:00pm   [phone]                 Follow-ups after your visit        Your next 10 appointments already scheduled     Apr 17, 2018  8:30 AM CDT   Post Op with Jesse Bashir MD   [facility] ([facility])    [address] [id number] [phone]              Who to contact     If you have questions or need follow up information about today's clinic visit or your schedule please contact [facility]  "directly at [phone].  Normal or non-critical lab and imaging results will be communicated to you by [employer], letter or phone within 4 business days after the clinic has received the results. If you do not hear from us within 7 days, please contact the clinic through [employer] or phone. If you have a critical or abnormal lab result, we will notify you by phone as soon as possible.  Submit refill requests through [employer] or call your pharmacy and they will forward the refill request to us. Please allow 3 business days for your refill to be completed.          Additional Information About Your Visit        [employer]li[employer] Borges [employer] Information     [employer] lets you send messages to your doctor, view your test results, renew your prescriptions, schedule appointments and more. To sign up, go to [email or web address] . Click on \"Log in\" on the left side of the screen, which will take you to the Welcome page. Then click on \"Sign up Now\" on the right side of the page.     You will be asked to enter the access code listed below, as well as some personal information. Please follow the directions to create your username and password.     Your access code is: [email or web address]  Expires: 2018  9:20 AM     Your access code will  in 90 days. If you need help or a new code, please call your [facility] clinic or [phone].        Care EveryWhere ID     This is your Care EveryWhere ID. This could be used by other organizations to access your [facility] medical records  [id number]        Your Vitals Were     Pulse Respirations Height Pulse Oximetry BMI (Body Mass Index)       94 16 1.702 m (5' 7\") 94% 32.89 kg/m2        Blood Pressure from Last 3 Encounters:   18 138/88   18 (!) 139/95   18 118/88    Weight from Last 3 Encounters:   18 95.3 kg (210 lb)   18 95.3 kg (210 lb)   18 96.6 kg (213 lb)              Today, you had the following     No orders found for display         Today's Medication Changes    "       These changes are accurate as of 4/3/18  8:10 AM.  If you have any questions, ask your nurse or doctor.               Start taking these medicines.        Dose/Directions    oxyCODONE IR 5 MG tablet   Commonly known as:  ROXICODONE   Used for:  Acute post-operative pain   Started by:  Jesse Bashir MD        Dose:  5 mg   Take 1 tablet (5 mg) by mouth every 4 hours as needed for severe pain maximum 6 tablet(s) per day   Quantity:  50 tablet   Refills:  0            Where to get your medicines      Some of these will need a paper prescription and others can be bought over the counter.  Ask your nurse if you have questions.     Bring a paper prescription for each of these medications     oxyCODONE IR 5 MG tablet                Primary Care Provider Office Phone # Fax #    [facility] [phone]       [address] [facility] [address]        Equal Access to Services     [facility] Hospital[facility]ANIEL : Hadii alicia montiel hadasho Soomaali, waaxda luqadaha, qaybta kaalmada adeegyada, jeannie barnesin haycortney gregg . So [facility] [phone].    ATENCIÓN: Si habla español, tiene a prado disposición servicios gratuitos de asistencia lingüística. Llame al [phone].    We comply with applicable federal civil rights laws and [place] laws. We do not discriminate on the basis of race, color, national origin, age, disability, sex, sexual orientation, or gender identity.            Thank you!     Thank you for choosing [facility]  for your care. Our goal is always to provide you with excellent care. Hearing back from our patients is one way we can continue to improve our services. Please take a few minutes to complete the written survey that you may receive in the mail after your visit with us. Thank you!             Your Updated Medication List - Protect others around you: Learn how to safely use, store and throw away your medicines at www.disposemymeds.org.           This list is accurate as of 4/3/18  8:10 AM.  Always use your most recent med list.                   Brand Name Dispense Instructions for use Diagnosis    amoxicillin-clavulanate 875-125 MG per tablet    AUGMENTIN    14 tablet    Take 1 tablet by mouth 2 times daily for 7 days    Tonsillitis, chronic, Acute post-operative pain, Deviated nasal septum       lidocaine (viscous) 2 % solution    XYLOCAINE    300 mL    Take 15 mLs by mouth every 2 hours as needed for moderate pain swish and spit every 3-8 hours as needed; max 8 doses/24 hour period    Tonsillitis, chronic, Acute post-operative pain, Deviated nasal septum       oxyCODONE IR 5 MG tablet    ROXICODONE    50 tablet    Take 1 tablet (5 mg) by mouth every 4 hours as needed for severe pain maximum 6 tablet(s) per day    Acute post-operative pain       oxyCODONE-acetaminophen 7.5-325 MG per tablet    PERCOCET    60 tablet    Take 1 tablet by mouth every 4 hours as needed for pain maximum 6 tablet(s) per day    Tonsillitis, chronic, Acute post-operative pain, Deviated nasal septum

## 2018-04-09 ENCOUNTER — TELEPHONE (OUTPATIENT)
Dept: FAMILY MEDICINE | Facility: CLINIC | Age: 33
End: 2018-04-09

## 2018-04-09 NOTE — TELEPHONE ENCOUNTER
Reason for Call:  Other call back    Detailed comments: patient is experiencing some bleeding out of the mouth, with no pain and would like to speak with Dr. Bashir care team to discuss. Please call to advise.     Phone Number Patient can be reached at: Home number on file [phone] (home)    Best Time: anytime     Can we leave a detailed message on this number? YES    Call taken on 4/9/2018 at 5:43 PM by Alf Mccauley

## 2018-04-10 NOTE — TELEPHONE ENCOUNTER
Called and spoke to patient.   Patient states that bleeding has resolved.   Patient denies fever, chills, nausea or pain.   Patient to avoid twisting, hard nose blowing or trauma.   Patient encouraged to call clinic if bleeding returns.    Vanita Fischer RN

## 2018-04-17 ENCOUNTER — OFFICE VISIT (OUTPATIENT)
Dept: OTOLARYNGOLOGY | Facility: CLINIC | Age: 33
End: 2018-04-17
Payer: COMMERCIAL

## 2018-04-17 VITALS
HEART RATE: 72 BPM | HEIGHT: 67 IN | WEIGHT: 210 LBS | OXYGEN SATURATION: 98 % | DIASTOLIC BLOOD PRESSURE: 86 MMHG | RESPIRATION RATE: 12 BRPM | BODY MASS INDEX: 32.96 KG/M2 | SYSTOLIC BLOOD PRESSURE: 128 MMHG

## 2018-04-17 DIAGNOSIS — J34.2 DEVIATED NASAL SEPTUM: Primary | ICD-10-CM

## 2018-04-17 DIAGNOSIS — G47.30 SLEEP-DISORDERED BREATHING: ICD-10-CM

## 2018-04-17 DIAGNOSIS — J35.1 HYPERTROPHY OF TONSILS: ICD-10-CM

## 2018-04-17 PROCEDURE — 99024 POSTOP FOLLOW-UP VISIT: CPT | Performed by: OTOLARYNGOLOGY

## 2018-04-17 NOTE — PATIENT INSTRUCTIONS
Scheduling Information  To schedule your CT/MRI scan, please contact [facility] at [phone] OR [facility] at [phone]    To schedule your Surgery, please contact our Specialty Schedulers at [phone]      ENT Clinic Locations Clinic Hours Telephone Number     [facility]  [address] [place] [address]   Monday:           1:00pm -- 5:00pm    Friday:              8:00am - 12:00pm   To schedule/reschedule an appointment with   Dr. Bashir,   please contact our   Specialty Scheduling Department at:     [phone]       [facility]  [address] [place] [address] Tuesday:          8:00am -- 2:00pm         Urgent Care Locations Clinic Hours Telephone Numbers     [facility]  [address] [place] [address]     Monday-Friday:     11:00am - 9:00pm    Saturday-Sunday:  9:00am - 5:00pm   [phone]     [facility]  [address] [place] [address]     Monday-Friday:      5:00pm - 9:00pm     Saturday-Sunday:  9:00am - 5:00pm   [phone]

## 2018-04-17 NOTE — PROGRESS NOTES
"History of Present Illness - Zoran Dixon is a 32 year old male who is status post tonsillectomy and septoplasty on 3/27/18.  I saw him on 4/3/18 to remove his septoplasty splints and thinks looked good.  Naturally, he was still in a lot of discomfort from the tonsillectomy at that point.    He is happy to tell me that he did turn the corner and is not needing any pain medication at this point. His eating and drinking is normal. Things are dramatically and noticeably more open with his breathing and his sleep.    Past medical history -   Patient Active Problem List   Diagnosis     Pain in joint, lower leg     CARDIOVASCULAR SCREENING; LDL GOAL LESS THAN 160     Obesity (BMI 30.0-34.9)     Alcohol consumption binge drinking     Congenital abnormality of limb     BENJI (obstructive sleep apnea)       /86  Pulse 72  Resp 12  Ht 1.702 m (5' 7\")  Wt 95.3 kg (210 lb)  SpO2 98%  BMI 32.89 kg/m2    General - The patient is well nourished and well developed, and appears to have good nutritional status.  Alert and oriented to person and place, answers questions and cooperates with examination appropriately.   Head and Face - Normocephalic and atraumatic, with no gross asymmetry noted of the contour of the facial features.  The facial nerve is intact, with strong symmetric movements.  Eyes - Extraocular movements intact, and the pupils were reactive to light.  Sclera were not icteric or injected, conjunctiva were pink and moist.  Mouth - Examination of the oral cavity shows pink, healthy, moist mucosa.  No lesions or ulceration noted.  The dentition are in good repair.  The tongue is mobile and midline.    A/P - Zoran Dixon  (J34.2) Deviated nasal septum  (primary encounter diagnosis)  (J35.1) Hypertrophy of tonsils  (G47.30) Sleep-disordered breathing    I have recommended that he wait another month before returning to sleep medicine to discuss testing.  This to wait for any residual edema to resolve.    Otherwise, " follow up with me as needed

## 2018-04-17 NOTE — LETTER
"    4/17/2018         RE: Zoran Dixon  [address]  [place] [address]        Dear Colleague,    Thank you for referring your patient, Zoran Dixon, to the [facility]. Please see a copy of my visit note below.    History of Present Illness - Zoran Dixon is a 32 year old male who is status post tonsillectomy and septoplasty on 3/27/18.  I saw him on 4/3/18 to remove his septoplasty splints and thinks looked good.  Naturally, he was still in a lot of discomfort from the tonsillectomy at that point.    He is happy to tell me that he did turn the corner and is not needing any pain medication at this point. His eating and drinking is normal. Things are dramatically and noticeably more open with his breathing and his sleep.    Past medical history -   Patient Active Problem List   Diagnosis     Pain in joint, lower leg     CARDIOVASCULAR SCREENING; LDL GOAL LESS THAN 160     Obesity (BMI 30.0-34.9)     Alcohol consumption binge drinking     Congenital abnormality of limb     BENJI (obstructive sleep apnea)       /86  Pulse 72  Resp 12  Ht 1.702 m (5' 7\")  Wt 95.3 kg (210 lb)  SpO2 98%  BMI 32.89 kg/m2    General - The patient is well nourished and well developed, and appears to have good nutritional status.  Alert and oriented to person and place, answers questions and cooperates with examination appropriately.   Head and Face - Normocephalic and atraumatic, with no gross asymmetry noted of the contour of the facial features.  The facial nerve is intact, with strong symmetric movements.  Eyes - Extraocular movements intact, and the pupils were reactive to light.  Sclera were not icteric or injected, conjunctiva were pink and moist.  Mouth - Examination of the oral cavity shows pink, healthy, moist mucosa.  No lesions or ulceration noted.  The dentition are in good repair.  The tongue is mobile and midline.    A/P - Zoran Dixon  (J34.2) Deviated nasal septum  (primary " encounter diagnosis)  (J35.1) Hypertrophy of tonsils  (G47.30) Sleep-disordered breathing    I have recommended that he wait another month before returning to sleep medicine to discuss testing.  This to wait for any residual edema to resolve.    Otherwise, follow up with me as needed     Again, thank you for allowing me to participate in the care of your patient.        Sincerely,        Jesse Bashir MD

## 2018-04-17 NOTE — MR AVS SNAPSHOT
After Visit Summary   4/17/2018    Zoran Dixon    MRN: [id number]           Patient Information     Date Of Birth          1985        Visit Information        Provider Department      4/17/2018 8:15 AM Jesse Bashir MD; [employer] [facility]        Today's Diagnoses     Deviated nasal septum    -  1    Hypertrophy of tonsils        Sleep-disordered breathing          Care Instructions    Scheduling Information  To schedule your CT/MRI scan, please contact [facility] at [phone] OR [facility] at [phone]    To schedule your Surgery, please contact our Specialty Schedulers at [phone]      ENT Clinic Locations Clinic Hours Telephone Number     [facility]  [address] [place] [address]   Monday:           1:00pm -- 5:00pm    Friday:              8:00am - 12:00pm   To schedule/reschedule an appointment with   Dr. Bashir,   please contact our   Specialty Scheduling Department at:     [phone]       [facility]  [address] [place] [address] Tuesday:          8:00am -- 2:00pm         Urgent Care Locations Clinic Hours Telephone Numbers     [facility]  [address] [place] [address]     Monday-Friday:     11:00am - 9:00pm    Saturday-Sunday:  9:00am - 5:00pm   [phone]     [facility]  [address] [place] [address]. [place] [address]     Monday-Friday:      5:00pm - 9:00pm     Saturday-Sunday:  9:00am - 5:00pm   [phone]                 Follow-ups after your visit        Who to contact     If you have questions or need follow up information about today's clinic visit or your schedule please contact [facility] directly at [phone].  Normal or non-critical lab and imaging results will be communicated to you by MyChart, letter or phone within 4 business days after the clinic has received the results. If you do not hear from us within 7  "days, please contact the clinic through [email or web address] or phone. If you have a critical or abnormal lab result, we will notify you by phone as soon as possible.  Submit refill requests through [email or web address] or call your pharmacy and they will forward the refill request to us. Please allow 3 business days for your refill to be completed.          Additional Information About Your Visit        [email or web address] Information     [email or web address] lets you send messages to your doctor, view your test results, renew your prescriptions, schedule appointments and more. To sign up, go to [email or web address] . Click on \"Log in\" on the left side of the screen, which will take you to the Welcome page. Then click on \"Sign up Now\" on the right side of the page.     You will be asked to enter the access code listed below, as well as some personal information. Please follow the directions to create your username and password.     Your access code is: [email or web address]  Expires: 2018  9:20 AM     Your access code will  in 90 days. If you need help or a new code, please call your [facility] clinic or [phone].        Care EveryWhere ID     This is your Care EveryWhere ID. This could be used by other organizations to access your [facility] medical records  [id number]        Your Vitals Were     Pulse Respirations Height Pulse Oximetry BMI (Body Mass Index)       72 12 1.702 m (5' 7\") 98% 32.89 kg/m2        Blood Pressure from Last 3 Encounters:   18 128/86   18 138/88   18 (!) 139/95    Weight from Last 3 Encounters:   18 95.3 kg (210 lb)   18 95.3 kg (210 lb)   18 95.3 kg (210 lb)              Today, you had the following     No orders found for display       Primary Care Provider Office Phone # Fax #    [facility] [phone]       [address]  [facility] [address]        Equal Access to Services     CHERI STRAUSS AH: Clara Ga, ursula bush, qaradha kirk " jeannie chanlibbyrodrigo fox'aan ah. So [facility] [phone].    ATENCIÓN: Si torito robertson, tiene a prado disposición servicios gratuitos de asistencia lingüística. Casa al [phone].    We comply with applicable federal civil rights laws and [place] laws. We do not discriminate on the basis of race, color, national origin, age, disability, sex, sexual orientation, or gender identity.            Thank you!     Thank you for choosing [facility]  for your care. Our goal is always to provide you with excellent care. Hearing back from our patients is one way we can continue to improve our services. Please take a few minutes to complete the written survey that you may receive in the mail after your visit with us. Thank you!             Your Updated Medication List - Protect others around you: Learn how to safely use, store and throw away your medicines at www.disposemymeds.org.          This list is accurate as of 4/17/18  8:55 AM.  Always use your most recent med list.                   Brand Name Dispense Instructions for use Diagnosis    lidocaine (viscous) 2 % solution    XYLOCAINE    300 mL    Take 15 mLs by mouth every 2 hours as needed for moderate pain swish and spit every 3-8 hours as needed; max 8 doses/24 hour period    Tonsillitis, chronic, Acute post-operative pain, Deviated nasal septum       oxyCODONE IR 5 MG tablet    ROXICODONE    50 tablet    Take 1 tablet (5 mg) by mouth every 4 hours as needed for severe pain maximum 6 tablet(s) per day    Acute post-operative pain       oxyCODONE-acetaminophen 7.5-325 MG per tablet    PERCOCET    60 tablet    Take 1 tablet by mouth every 4 hours as needed for pain maximum 6 tablet(s) per day    Tonsillitis, chronic, Acute post-operative pain, Deviated nasal septum

## 2018-06-07 NOTE — ANESTHESIA CARE TRANSFER NOTE
Patient: Zoran Dixon    Procedure(s):  Bilateral tonsillectomy, adenoidectomy, Uvulectomy, Endoscopic septoplasty with turbinate reduction - Wound Class: II-Clean Contaminated   - Wound Class: II-Clean Contaminated    Diagnosis: Tonsil hypertrophy, deviated nasal septum  Diagnosis Additional Information: No value filed.    Anesthesia Type:   General, ETT     Note:  Airway :Face Mask  Patient transferred to:PACU  Comments: Exchanging well with oral airway. Sats 97%, Report to RN.      Vitals: (Last set prior to Anesthesia Care Transfer)    CRNA VITALS  3/27/2018 1110 - 3/27/2018 1149      3/27/2018             Pulse: 83    SpO2: 99 %    Resp Rate (observed): 11                Electronically Signed By: NAYELY Gamble CRNA  March 27, 2018  11:49 AM  
QTc 461

## 2018-06-29 ENCOUNTER — TELEPHONE (OUTPATIENT)
Dept: OTOLARYNGOLOGY | Facility: CLINIC | Age: 33
End: 2018-06-29

## 2018-06-29 NOTE — TELEPHONE ENCOUNTER
"..Reason for Call:    nasal congestion    Detailed comments: Patient had tonsillectomy and \"nasal\" surgery per caller and has been feeling congestion, kind of happens, suddenly;   Please inform if nec to be seen;     Phone Number Patient can be reached at: Home number on file [phone] (home)    Best Time: anytime    Can we leave a detailed message on this number? YES    Call taken on 6/29/2018 at 2:35 PM by Keira Mckeon      "

## 2018-07-02 NOTE — TELEPHONE ENCOUNTER
Reason for Call:  Other returning call    Detailed comments: Patient is returning a call.     Ph[facility]: [facility]    Phone Number Patient can be reached at: Home number on file [phone] (home)    Best Time: today until 3pm. Tomorrow anytime until 3pm.     Can we leave a detailed message on this number? YES    Call taken on 7/2/2018 at 2:09 PM by Gaudencio Fowler

## 2018-07-02 NOTE — TELEPHONE ENCOUNTER
Left  for pt to return call to discuss symptoms further.    Dewey Saunders RN....7/2/2018 8:51 AM

## 2018-07-02 NOTE — TELEPHONE ENCOUNTER
Called and spoke to patient.   Patient states that his nose on the right is swollen.   He says that it is almost all the time.   Patient states that he has a hard time breathing out of that side.   Patient denies using any OTC medications.   Follow up appointment scheduled.   Vanita Fischer RN

## 2018-07-03 ENCOUNTER — OFFICE VISIT (OUTPATIENT)
Dept: OTOLARYNGOLOGY | Facility: CLINIC | Age: 33
End: 2018-07-03
Payer: COMMERCIAL

## 2018-07-03 VITALS
SYSTOLIC BLOOD PRESSURE: 141 MMHG | WEIGHT: 210 LBS | RESPIRATION RATE: 12 BRPM | HEART RATE: 63 BPM | BODY MASS INDEX: 32.96 KG/M2 | HEIGHT: 67 IN | DIASTOLIC BLOOD PRESSURE: 94 MMHG | OXYGEN SATURATION: 95 %

## 2018-07-03 DIAGNOSIS — J34.2 DEVIATED NASAL SEPTUM: Primary | ICD-10-CM

## 2018-07-03 DIAGNOSIS — J35.2 ADENOID HYPERTROPHY: ICD-10-CM

## 2018-07-03 DIAGNOSIS — J34.89 NASAL OBSTRUCTION: ICD-10-CM

## 2018-07-03 PROCEDURE — 99024 POSTOP FOLLOW-UP VISIT: CPT | Performed by: OTOLARYNGOLOGY

## 2018-07-03 PROCEDURE — 31231 NASAL ENDOSCOPY DX: CPT | Mod: 58 | Performed by: OTOLARYNGOLOGY

## 2018-07-03 RX ORDER — AZELASTINE HYDROCHLORIDE 137 UG/1
2 SPRAY, METERED NASAL AT BEDTIME
Qty: 1 BOTTLE | Refills: 12 | Status: SHIPPED | OUTPATIENT
Start: 2018-07-03 | End: 2019-02-14

## 2018-07-03 RX ORDER — FLUTICASONE PROPIONATE 50 MCG
2 SPRAY, SUSPENSION (ML) NASAL AT BEDTIME
Qty: 3 BOTTLE | Refills: 11 | Status: SHIPPED | OUTPATIENT
Start: 2018-07-03 | End: 2019-02-14

## 2018-07-03 NOTE — LETTER
"    7/3/2018         RE: Zoran Dixon  [address]  [facility] [address]        Dear Colleague,    Thank you for referring your patient, Zoran Dixon, to the [facility]. Please see a copy of my visit note below.    History of Present Illness - Zoran Dixon is a 32 year old male who is status post tonsillectomy and septoplasty on 3/27/18. Last seen on 4/17/2018.    He tells me that after the surgery things \"were wide open and clear.\"  But over the past month, the RIGHT side has slowly gotten more obstructed.  It does not come and go, but slowly and progressively is more closed.  THere was no URI or illness, just the obstruction.    Past medical history -   Patient Active Problem List   Diagnosis     Pain in joint, lower leg     CARDIOVASCULAR SCREENING; LDL GOAL LESS THAN 160     Obesity (BMI 30.0-34.9)     Alcohol consumption binge drinking     Congenital abnormality of limb     BENJI (obstructive sleep apnea)       BP (!) 141/94  Pulse 63  Resp 12  Ht 1.702 m (5' 7\")  Wt 95.3 kg (210 lb)  SpO2 95%  BMI 32.89 kg/m2    General - The patient is well nourished and well developed, and appears to have good nutritional status.  Alert and oriented to person and place, answers questions and cooperates with examination appropriately.   Head and Face - Normocephalic and atraumatic, with no gross asymmetry noted of the contour of the facial features.  The facial nerve is intact, with strong symmetric movements.  Eyes - Extraocular movements intact, and the pupils were reactive to light.  Sclera were not icteric or injected, conjunctiva were pink and moist.  Mouth - Examination of the oral cavity shows pink, healthy, moist mucosa.  No lesions or ulceration noted.  The dentition are in good repair.  The tongue is mobile and midline.    Endoscopy - a rigid thirty degree scope was used to inspect the nose and photos were taken.  The septum is straight and the nasal airway was open on the LEFT.  " Middle turbinate was normal in appearance.  The RIGHT side was inspected and the septum is straight, but the inferior and middle turbinates are very boggy and edematous.  No purulence noted.    A/P - Zoran Dixon  (J34.2) Deviated nasal septum  (primary encounter diagnosis)  (J34.89) Nasal obstruction  (J35.2) Adenoid hypertrophy    I will try a combo of flonase and azelastine to try and get the allergic rhinitis under control.  If that does not help after a month, then return to me and we can try other medical management.  As an absolute last resort, we could do revision adenoidectomy and partial middle turbinate resection with submucous resection of inferior turbinates.    Again, thank you for allowing me to participate in the care of your patient.        Sincerely,        Jesse Bashir MD

## 2018-07-03 NOTE — PATIENT INSTRUCTIONS
Scheduling Information  To schedule your CT/MRI scan, please contact [facility] at [phone] OR [facility] at [phone]    To schedule your Surgery, please contact our Specialty Schedulers at [phone]      ENT Clinic Locations Clinic Hours Telephone Number     [facility]  [address] [place] [address]   Monday:           1:00pm -- 5:00pm    Friday:              8:00am - 12:00pm   To schedule/reschedule an appointment with   Dr. Bashir,   please contact our   Specialty Scheduling Department at:     [phone]       [facility]  [address] [place] [address] Tuesday:          8:00am -- 2:00pm         Urgent Care Locations Clinic Hours Telephone Numbers     [facility]  [address] [address]     Monday-Friday:     11:00am - 9:00pm    Saturday-Sunday:  9:00am - 5:00pm   [phone]     [facility]  [address] [place] [address]     Monday-Friday:      5:00pm - 9:00pm     Saturday-Sunday:  9:00am - 5:00pm   [phone]

## 2018-07-03 NOTE — PROGRESS NOTES
"History of Present Illness - Zoran Dixon is a 32 year old male who is status post tonsillectomy and septoplasty on 3/27/18. Last seen on 4/17/2018.    He tells me that after the surgery things \"were wide open and clear.\"  But over the past month, the RIGHT side has slowly gotten more obstructed.  It does not come and go, but slowly and progressively is more closed.  THere was no URI or illness, just the obstruction.    Past medical history -   Patient Active Problem List   Diagnosis     Pain in joint, lower leg     CARDIOVASCULAR SCREENING; LDL GOAL LESS THAN 160     Obesity (BMI 30.0-34.9)     Alcohol consumption binge drinking     Congenital abnormality of limb     BENJI (obstructive sleep apnea)       BP (!) 141/94  Pulse 63  Resp 12  Ht 1.702 m (5' 7\")  Wt 95.3 kg (210 lb)  SpO2 95%  BMI 32.89 kg/m2    General - The patient is well nourished and well developed, and appears to have good nutritional status.  Alert and oriented to person and place, answers questions and cooperates with examination appropriately.   Head and Face - Normocephalic and atraumatic, with no gross asymmetry noted of the contour of the facial features.  The facial nerve is intact, with strong symmetric movements.  Eyes - Extraocular movements intact, and the pupils were reactive to light.  Sclera were not icteric or injected, conjunctiva were pink and moist.  Mouth - Examination of the oral cavity shows pink, healthy, moist mucosa.  No lesions or ulceration noted.  The dentition are in good repair.  The tongue is mobile and midline.    Endoscopy - a rigid thirty degree scope was used to inspect the nose and photos were taken.  The septum is straight and the nasal airway was open on the LEFT.  Middle turbinate was normal in appearance.  The RIGHT side was inspected and the septum is straight, but the inferior and middle turbinates are very boggy and edematous.  No purulence noted.    A/P - Zoran Dixon  (J34.2) Deviated nasal septum  " (primary encounter diagnosis)  (J34.89) Nasal obstruction  (J35.2) Adenoid hypertrophy    I will try a combo of flonase and azelastine to try and get the allergic rhinitis under control.  If that does not help after a month, then return to me and we can try other medical management.  As an absolute last resort, we could do revision adenoidectomy and partial middle turbinate resection with submucous resection of inferior turbinates.

## 2018-07-03 NOTE — MR AVS SNAPSHOT
After Visit Summary   7/3/2018    Zoran Dixon    MRN: [id number]           Patient Information     Date Of Birth          1985        Visit Information        Provider Department      7/3/2018 10:00 AM Jesse Bashir MD; [employer] [facility]        Today's Diagnoses     Deviated nasal septum    -  1    Nasal obstruction        Adenoid hypertrophy          Care Instructions    Scheduling Information  To schedule your CT/MRI scan, please contact [facility] at [phone] OR [facility] at [phone]    To schedule your Surgery, please contact our Specialty Schedulers at [phone]      ENT Clinic Locations Clinic Hours Telephone Number     [facility]  [address] [place] [address]   Monday:           1:00pm -- 5:00pm    Friday:              8:00am - 12:00pm   To schedule/reschedule an appointment with   Dr. Bashir,   please contact our   Specialty Scheduling Department at:     [phone]       [facility]  [address] [address] Tuesday:          8:00am -- 2:00pm         Urgent Care Locations Clinic Hours Telephone Numbers     [facility]  [address] [place] [address]     Monday-Friday:     11:00am - 9:00pm    Saturday-Sunday:  9:00am - 5:00pm   [phone]     [facility]  [address] [place] [address]     Monday-Friday:      5:00pm - 9:00pm     Saturday-Sunday:  9:00am - 5:00pm   [phone]                 Follow-ups after your visit        Who to contact     If you have questions or need follow up information about today's clinic visit or your schedule please contact [facility] directly at [phone].  Normal or non-critical lab and imaging results will be communicated to you by MyChart, letter or phone within 4 business days after the clinic has received the results. If you do not hear from us within 7 days, please  "contact the clinic through [employer] or phone. If you have a critical or abnormal lab result, we will notify you by phone as soon as possible.  Submit refill requests through [employer] or call your pharmacy and they will forward the refill request to us. Please allow 3 business days for your refill to be completed.          Additional Information About Your Visit        [employer] Information     [employer] lets you send messages to your doctor, view your test results, renew your prescriptions, schedule appointments and more. To sign up, go to [email or web address] . Click on \"Log in\" on the left side of the screen, which will take you to the Welcome page. Then click on \"Sign up Now\" on the right side of the page.     You will be asked to enter the access code listed below, as well as some personal information. Please follow the directions to create your username and password.     Your access code is: [email or web address]  Expires: 10/1/2018 10:47 AM     Your access code will  in 90 days. If you need help or a new code, please call your [place] clinic or [phone].        Care EveryWhere ID     This is your Care EveryWhere ID. This could be used by other organizations to access your [place] medical records  [id number]        Your Vitals Were     Pulse Respirations Height Pulse Oximetry BMI (Body Mass Index)       63 12 1.702 m (5' 7\") 95% 32.89 kg/m2        Blood Pressure from Last 3 Encounters:   18 (!) 141/94   18 128/86   18 138/88    Weight from Last 3 Encounters:   18 95.3 kg (210 lb)   18 95.3 kg (210 lb)   18 95.3 kg (210 lb)              We Performed the Following     NASAL ENDOSCOPY, DIAGNOSTIC          Today's Medication Changes          These changes are accurate as of 7/3/18 10:47 AM.  If you have any questions, ask your nurse or doctor.               Start taking these medicines.        Dose/Directions    Azelastine HCl 137 MCG/SPRAY Soln   Used for:  Deviated nasal " septum, Nasal obstruction, Adenoid hypertrophy   Started by:  Jesse Bashir MD        Dose:  2 puff   Spray 2 puffs in nostril At Bedtime   Quantity:  1 Bottle   Refills:  12       fluticasone 50 MCG/ACT spray   Commonly known as:  FLONASE   Used for:  Deviated nasal septum, Nasal obstruction, Adenoid hypertrophy   Started by:  Jesse Bashir MD        Dose:  2 spray   Spray 2 sprays into both nostrils At Bedtime   Quantity:  3 Bottle   Refills:  11            Where to get your medicines      These medications [place] [facility] [place] - [address]  [address], [facility] [address]     Phone:  [phone]     Azelastine HCl 137 MCG/SPRAY Soln    fluticasone 50 MCG/ACT spray                Primary Care Provider Office Phone # Fax #    [facility] [phone]       [address]  [facility] [address]        Equal Access to Services     [facility] AH: Hadii aad ku hadasho Soomaali, waaxda luqadaha, qaybta kaalmada adeegyada, waxay idiin hayaan adeeg kharash la'cortney . So [facility] [phone].    ATENCIÓN: Si habla español, tiene a prado disposición servicios gratuitos de asistencia lingüística. [facility] [phone].    We comply with applicable federal civil rights laws and [place] laws. We do not discriminate on the basis of race, color, national origin, age, disability, sex, sexual orientation, or gender identity.            Thank you!     Thank you for choosing [facility]  for your care. Our goal is always to provide you with excellent care. Hearing back from our patients is one way we can continue to improve our services. Please take a few minutes to complete the written survey that you may receive in the mail after your visit with us. Thank you!             Your Updated Medication List - Protect others around you: Learn how to safely use, store and throw away your medicines at  www.disposemymeds.org.          This list is accurate as of 7/3/18 10:47 AM.  Always use your most recent med list.                   Brand Name Dispense Instructions for use Diagnosis    Azelastine HCl 137 MCG/SPRAY Soln     1 Bottle    Spray 2 puffs in nostril At Bedtime    Deviated nasal septum, Nasal obstruction, Adenoid hypertrophy       fluticasone 50 MCG/ACT spray    FLONASE    3 Bottle    Spray 2 sprays into both nostrils At Bedtime    Deviated nasal septum, Nasal obstruction, Adenoid hypertrophy       lidocaine (viscous) 2 % solution    XYLOCAINE    300 mL    Take 15 mLs by mouth every 2 hours as needed for moderate pain swish and spit every 3-8 hours as needed; max 8 doses/24 hour period    Tonsillitis, chronic, Acute post-operative pain, Deviated nasal septum       oxyCODONE IR 5 MG tablet    ROXICODONE    50 tablet    Take 1 tablet (5 mg) by mouth every 4 hours as needed for severe pain maximum 6 tablet(s) per day    Acute post-operative pain       oxyCODONE-acetaminophen 7.5-325 MG per tablet    PERCOCET    60 tablet    Take 1 tablet by mouth every 4 hours as needed for pain maximum 6 tablet(s) per day    Tonsillitis, chronic, Acute post-operative pain, Deviated nasal septum

## 2019-02-14 ENCOUNTER — OFFICE VISIT (OUTPATIENT)
Dept: FAMILY MEDICINE | Facility: CLINIC | Age: 34
End: 2019-02-14
Payer: COMMERCIAL

## 2019-02-14 VITALS
HEART RATE: 75 BPM | BODY MASS INDEX: 29.34 KG/M2 | WEIGHT: 193.6 LBS | RESPIRATION RATE: 16 BRPM | TEMPERATURE: 98.1 F | DIASTOLIC BLOOD PRESSURE: 81 MMHG | SYSTOLIC BLOOD PRESSURE: 124 MMHG | HEIGHT: 68 IN | OXYGEN SATURATION: 96 %

## 2019-02-14 DIAGNOSIS — Z00.00 ROUTINE HISTORY AND PHYSICAL EXAMINATION OF ADULT: Primary | ICD-10-CM

## 2019-02-14 DIAGNOSIS — Z13.6 CARDIOVASCULAR SCREENING; LDL GOAL LESS THAN 160: ICD-10-CM

## 2019-02-14 DIAGNOSIS — Z13.1 SCREENING FOR DIABETES MELLITUS: ICD-10-CM

## 2019-02-14 DIAGNOSIS — Z23 FLU VACCINE NEED: ICD-10-CM

## 2019-02-14 LAB
ALBUMIN SERPL-MCNC: 4.3 G/DL (ref 3.4–5)
ALP SERPL-CCNC: 76 U/L (ref 40–150)
ALT SERPL W P-5'-P-CCNC: 42 U/L (ref 0–70)
ANION GAP SERPL CALCULATED.3IONS-SCNC: 7 MMOL/L (ref 3–14)
AST SERPL W P-5'-P-CCNC: 27 U/L (ref 0–45)
BILIRUB SERPL-MCNC: 0.8 MG/DL (ref 0.2–1.3)
BUN SERPL-MCNC: 11 MG/DL (ref 7–30)
CALCIUM SERPL-MCNC: 9.2 MG/DL (ref 8.5–10.1)
CHLORIDE SERPL-SCNC: 108 MMOL/L (ref 94–109)
CHOLEST SERPL-MCNC: 166 MG/DL
CO2 SERPL-SCNC: 25 MMOL/L (ref 20–32)
CREAT SERPL-MCNC: 0.96 MG/DL (ref 0.66–1.25)
GFR SERPL CREATININE-BSD FRML MDRD: >90 ML/MIN/{1.73_M2}
GLUCOSE SERPL-MCNC: 82 MG/DL (ref 70–99)
HDLC SERPL-MCNC: 55 MG/DL
LDLC SERPL CALC-MCNC: 91 MG/DL
NONHDLC SERPL-MCNC: 111 MG/DL
POTASSIUM SERPL-SCNC: 4.1 MMOL/L (ref 3.4–5.3)
PROT SERPL-MCNC: 7.4 G/DL (ref 6.8–8.8)
SODIUM SERPL-SCNC: 140 MMOL/L (ref 133–144)
TRIGL SERPL-MCNC: 100 MG/DL

## 2019-02-14 PROCEDURE — 36415 COLL VENOUS BLD VENIPUNCTURE: CPT | Performed by: FAMILY MEDICINE

## 2019-02-14 PROCEDURE — 90686 IIV4 VACC NO PRSV 0.5 ML IM: CPT | Performed by: FAMILY MEDICINE

## 2019-02-14 PROCEDURE — 99395 PREV VISIT EST AGE 18-39: CPT | Mod: 25 | Performed by: FAMILY MEDICINE

## 2019-02-14 PROCEDURE — 80053 COMPREHEN METABOLIC PANEL: CPT | Performed by: FAMILY MEDICINE

## 2019-02-14 PROCEDURE — 80061 LIPID PANEL: CPT | Performed by: FAMILY MEDICINE

## 2019-02-14 PROCEDURE — 90471 IMMUNIZATION ADMIN: CPT | Performed by: FAMILY MEDICINE

## 2019-02-14 ASSESSMENT — PAIN SCALES - GENERAL: PAINLEVEL: NO PAIN (0)

## 2019-02-14 ASSESSMENT — MIFFLIN-ST. JEOR: SCORE: 1796.91

## 2019-02-14 NOTE — LETTER
February 15, 2019        Zoran Dixon  [address] [facility] [address]        Dear Zoran,    Your kidney, liver, electrolyte, blood sugar and cholesterol tests were normal for you. Please follow up in 1 year for routine physical.    Sincerely,      Pradip Mclean MD/gianni      Resulted Orders   Comprehensive metabolic panel (BMP + Alb, Alk Phos, ALT, AST, Total. Bili, TP)   Result Value Ref Range    Sodium 140 133 - 144 mmol/L    Potassium 4.1 3.4 - 5.3 mmol/L    Chloride 108 94 - 109 mmol/L    Carbon Dioxide 25 20 - 32 mmol/L    Anion Gap 7 3 - 14 mmol/L    Glucose 82 70 - 99 mg/dL      Comment:      Fasting specimen    Urea Nitrogen 11 7 - 30 mg/dL    Creatinine 0.96 0.66 - 1.25 mg/dL    GFR Estimate >90 >60 mL/min/[1.73_m2]      Comment:      Non  GFR Calc  Starting 12/18/2018, serum creatinine based estimated GFR (eGFR) will be   calculated using the Chronic Kidney Disease Epidemiology Collaboration   (CKD-EPI) equation.      GFR Estimate If Black >90 >60 mL/min/[1.73_m2]      Comment:       GFR Calc  Starting 12/18/2018, serum creatinine based estimated GFR (eGFR) will be   calculated using the Chronic Kidney Disease Epidemiology Collaboration   (CKD-EPI) equation.      Calcium 9.2 8.5 - 10.1 mg/dL    Bilirubin Total 0.8 0.2 - 1.3 mg/dL    Albumin 4.3 3.4 - 5.0 g/dL    Protein Total 7.4 6.8 - 8.8 g/dL    Alkaline Phosphatase 76 40 - 150 U/L    ALT 42 0 - 70 U/L    AST 27 0 - 45 U/L   Lipid Profile (Chol, Trig, HDL, LDL calc)   Result Value Ref Range    Cholesterol 166 <200 mg/dL    Triglycerides 100 <150 mg/dL      Comment:      Fasting specimen    HDL Cholesterol 55 >39 mg/dL    LDL Cholesterol Calculated 91 <100 mg/dL      Comment:      Desirable:       <100 mg/dl    Non HDL Cholesterol 111 <130 mg/dL

## 2019-02-14 NOTE — PROGRESS NOTES
SUBJECTIVE:   CC: Zoran Dixon is an 33 year old male who presents for preventive health visit.     Healthy Habits:    Do you get at least three servings of calcium containing foods daily (dairy, green leafy vegetables, etc.)? yes    Amount of exercise or daily activities, outside of work: 7 day(s) per week    Problems taking medications regularly No    Medication side effects: No    Have you had an eye exam in the past two years? no    Do you see a dentist twice per year? no    Do you have sleep apnea, excessive snoring or daytime drowsiness?no      PROBLEMS TO ADD ON...    Patient had an episode yesterday of sharp pain in the left side of his chest lasting all day.  Has been having a hard time breathing out of the right nostril- has tried flonase but it doesn't seem to help.  -------------------------------------    Today's PHQ-2 Score:   PHQ-2 (  [employer]) 2018   Q1: Little interest or pleasure in doing things 0   Q2: Feeling down, depressed or hopeless 0   PHQ-2 Score 0       Abuse: Current or Past(Physical, Sexual or Emotional)- No  Do you feel safe in your environment? Yes    Social History     Tobacco Use     Smoking status: Former Smoker     Types: Cigarettes     Last attempt to quit: 2017     Years since quittin.2     Smokeless tobacco: Never Used   Substance Use Topics     Alcohol use: Yes     Comment: occasionally     If you drink alcohol do you typically have >3 drinks per day or >7 drinks per week? No                      Last PSA: No results found for: PSA    Reviewed orders with patient. Reviewed health maintenance and updated orders accordingly - Yes  Labs reviewed in EPIC    Reviewed and updated as needed this visit by clinical staff         Reviewed and updated as needed this visit by Provider            ROS:  CONSTITUTIONAL: NEGATIVE for fever, chills, change in weight  INTEGUMENTARY/SKIN: NEGATIVE for worrisome rashes, moles or lesions  EYES: NEGATIVE for vision changes or  "irritation  ENT: NEGATIVE for ear, mouth and throat problems  RESP: NEGATIVE for significant cough or SOB  CV: NEGATIVE for chest pain, palpitations or peripheral edema  GI: NEGATIVE for nausea, abdominal pain, heartburn, or change in bowel habits   male: negative for dysuria, hematuria, decreased urinary stream, erectile dysfunction, urethral discharge  MUSCULOSKELETAL: NEGATIVE for significant arthralgias or myalgia  NEURO: NEGATIVE for weakness, dizziness or paresthesias  PSYCHIATRIC: NEGATIVE for changes in mood or affect    OBJECTIVE:   /81   Pulse 75   Temp 98.1  F (36.7  C) (Tympanic)   Resp 16   Ht 1.718 m (5' 7.64\")   Wt 87.8 kg (193 lb 9.6 oz)   SpO2 96%   BMI 29.75 kg/m    EXAM:  GENERAL: healthy, alert and no distress  NECK: no adenopathy, no asymmetry, masses, or scars and thyroid normal to palpation  RESP: lungs clear to auscultation - no rales, rhonchi or wheezes  CV: regular rate and rhythm, normal S1 S2, no S3 or S4, no murmur, click or rub, no peripheral edema and peripheral pulses strong  ABDOMEN: soft, nontender, no hepatosplenomegaly, no masses and bowel sounds normal  MS: no gross musculoskeletal defects noted, no edema    Diagnostic Test Results:  none     ASSESSMENT/PLAN:   1. Routine history and physical examination of adult  As below.    2. CARDIOVASCULAR SCREENING; LDL GOAL LESS THAN 160    - Comprehensive metabolic panel (BMP + Alb, Alk Phos, ALT, AST, Total. Bili, TP)  - Lipid Profile (Chol, Trig, HDL, LDL calc)    3. Screening for diabetes mellitus    - Comprehensive metabolic panel (BMP + Alb, Alk Phos, ALT, AST, Total. Bili, TP)    4. Flu vaccine need    - FLU VAC PRESRV FREE QUAD SPLIT VIR, IM (3+ YRS)  - ADMIN 1st VACCINE    COUNSELING:  Reviewed preventive health counseling, as reflected in patient instructions       Regular exercise       Healthy diet/nutrition       Vision screening    BP Readings from Last 1 Encounters:   07/03/18 (!) 141/94     Estimated body " "mass index is 32.89 kg/m  as calculated from the following:    Height as of 7/3/18: 1.702 m (5' 7\").    Weight as of 7/3/18: 95.3 kg (210 lb).           reports that he quit smoking about 14 months ago. His smoking use included cigarettes. he has never used smokeless tobacco.      Counseling Resources:  ATP IV Guidelines  Pooled Cohorts Equation Calculator  FRAX Risk Assessment  ICSI Preventive Guidelines  Dietary Guidelines for Americans, 2010  USDA's MyPlate  ASA Prophylaxis  Lung CA Screening    Pradip Mclean MD, MD  [facility]  "

## 2019-03-05 ENCOUNTER — OFFICE VISIT (OUTPATIENT)
Dept: OTOLARYNGOLOGY | Facility: CLINIC | Age: 34
End: 2019-03-05
Payer: COMMERCIAL

## 2019-03-05 VITALS
HEART RATE: 70 BPM | OXYGEN SATURATION: 97 % | WEIGHT: 195 LBS | BODY MASS INDEX: 30.61 KG/M2 | SYSTOLIC BLOOD PRESSURE: 150 MMHG | RESPIRATION RATE: 16 BRPM | DIASTOLIC BLOOD PRESSURE: 96 MMHG | HEIGHT: 67 IN

## 2019-03-05 DIAGNOSIS — J34.2 DEVIATED NASAL SEPTUM: Primary | ICD-10-CM

## 2019-03-05 DIAGNOSIS — J34.89 NASAL OBSTRUCTION: ICD-10-CM

## 2019-03-05 DIAGNOSIS — J35.2 ADENOID HYPERTROPHY: ICD-10-CM

## 2019-03-05 PROCEDURE — 99214 OFFICE O/P EST MOD 30 MIN: CPT | Mod: 25 | Performed by: OTOLARYNGOLOGY

## 2019-03-05 PROCEDURE — 31231 NASAL ENDOSCOPY DX: CPT | Performed by: OTOLARYNGOLOGY

## 2019-03-05 ASSESSMENT — MIFFLIN-ST. JEOR: SCORE: 1793.14

## 2019-03-05 NOTE — PATIENT INSTRUCTIONS
Scheduling Information  To schedule your CT/MRI scan, please contact [facility] at [phone] OR [facility] at [phone]    To schedule your Surgery, please contact our Specialty Schedulers at [phone]      ENT Clinic Locations Clinic Hours Telephone Number     [facility]  [address] [place] [address]   Monday:           1:00pm -- 5:00pm    Friday:              8:00am - 12:00pm   To schedule/reschedule an appointment with   Dr. Bashir,   please contact our   Specialty Scheduling Department at:     [phone]       [facility]  [address] [place] [address] Tuesday:          8:00am -- 2:00pm         Urgent Care Locations Clinic Hours Telephone Numbers     [facility]  [address] [place] [address]     Monday-Friday:     11:00am - 9:00pm    Saturday-Sunday:  9:00am - 5:00pm   [phone]     [facility]  [address] [place] [address]     Monday-Friday:      5:00pm - 9:00pm     Saturday-Sunday:  9:00am - 5:00pm   [phone]

## 2019-03-05 NOTE — PROGRESS NOTES
"History of Present Illness - Zoran Dixon is a 32 year old male who is status post tonsillectomy and septoplasty on 3/27/18. Last seen on 7/28/2018    To review, after the surgery things \"were wide open and clear.\"  But over the past month, the RIGHT side has slowly gotten more obstructed.  It does not come and go, but slowly and progressively is more closed.  THere was no URI or illness, just the obstruction.    On exam, he did have signs of allergic rhinitis, and hypertrophy had returned to the turbinates.  I tried him on flonase and astelin combination in July 2018 and was lost to follow up until now.    Past medical history -   Patient Active Problem List   Diagnosis     Pain in joint, lower leg     CARDIOVASCULAR SCREENING; LDL GOAL LESS THAN 160     Obesity (BMI 30.0-34.9)     Alcohol consumption binge drinking     Congenital abnormality of limb     BENJI (obstructive sleep apnea)       BP (!) 150/96   Pulse 70   Resp 16   Ht 1.702 m (5' 7\")   Wt 88.5 kg (195 lb)   SpO2 97%   BMI 30.54 kg/m      General - The patient is well nourished and well developed, and appears to have good nutritional status.  Alert and oriented to person and place, answers questions and cooperates with examination appropriately.   Head and Face - Normocephalic and atraumatic, with no gross asymmetry noted of the contour of the facial features.  The facial nerve is intact, with strong symmetric movements.  Eyes - Extraocular movements intact, and the pupils were reactive to light.  Sclera were not icteric or injected, conjunctiva were pink and moist.  Mouth - Examination of the oral cavity shows pink, healthy, moist mucosa.  No lesions or ulceration noted.  The dentition are in good repair.  The tongue is mobile and midline.    Endoscopy - a rigid thirty degree scope was used to inspect the nose and photos were taken.  The septum is straight but the inferior turbinates were extremely hypertrophied bilaterally.  I was able to get " through the RIGHT side and there is visible adenoid tissue int he pharynx growing into the nasal choanae as well.    A/P - Zoran Dixon  (J34.2) Deviated nasal septum  (primary encounter diagnosis)  (J34.89) Nasal obstruction  (J35.2) Adenoid hypertrophy    At this point, after a year of medicla management the turbinates are still extremely hypertrophied and obstructive and there is direct endoscopic visualization of adenoid regrowth.    I recommend revision adenoidectomy, turbinate reduction and out fracture in the OR.

## 2019-03-05 NOTE — LETTER
"    3/5/2019         RE: Zoran Dixon  [address]  [facility] [address]        Dear Colleague,    Thank you for referring your patient, Zoran Dixon, to the [facility]. Please see a copy of my visit note below.    History of Present Illness - Zoran Dixon is a 32 year old male who is status post tonsillectomy and septoplasty on 3/27/18. Last seen on 7/28/2018    To review, after the surgery things \"were wide open and clear.\"  But over the past month, the RIGHT side has slowly gotten more obstructed.  It does not come and go, but slowly and progressively is more closed.  THere was no URI or illness, just the obstruction.    On exam, he did have signs of allergic rhinitis, and hypertrophy had returned to the turbinates.  I tried him on flonase and astelin combination in July 2018 and was lost to follow up until now.    Past medical history -   Patient Active Problem List   Diagnosis     Pain in joint, lower leg     CARDIOVASCULAR SCREENING; LDL GOAL LESS THAN 160     Obesity (BMI 30.0-34.9)     Alcohol consumption binge drinking     Congenital abnormality of limb     BENJI (obstructive sleep apnea)       BP (!) 150/96   Pulse 70   Resp 16   Ht 1.702 m (5' 7\")   Wt 88.5 kg (195 lb)   SpO2 97%   BMI 30.54 kg/m       General - The patient is well nourished and well developed, and appears to have good nutritional status.  Alert and oriented to person and place, answers questions and cooperates with examination appropriately.   Head and Face - Normocephalic and atraumatic, with no gross asymmetry noted of the contour of the facial features.  The facial nerve is intact, with strong symmetric movements.  Eyes - Extraocular movements intact, and the pupils were reactive to light.  Sclera were not icteric or injected, conjunctiva were pink and moist.  Mouth - Examination of the oral cavity shows pink, healthy, moist mucosa.  No lesions or ulceration noted.  The dentition are in good repair.  " The tongue is mobile and midline.    Endoscopy - a rigid thirty degree scope was used to inspect the nose and photos were taken.  The septum is straight but the inferior turbinates were extremely hypertrophied bilaterally.  I was able to get through the RIGHT side and there is visible adenoid tissue int he pharynx growing into the nasal choanae as well.    A/P - Zoran Dixon  (J34.2) Deviated nasal septum  (primary encounter diagnosis)  (J34.89) Nasal obstruction  (J35.2) Adenoid hypertrophy    At this point, after a year of medicla management the turbinates are still extremely hypertrophied and obstructive and there is direct endoscopic visualization of adenoid regrowth.    I recommend revision adenoidectomy, turbinate reduction and out fracture in the OR.    Again, thank you for allowing me to participate in the care of your patient.        Sincerely,        Jesse Bashir MD

## 2019-03-06 ENCOUNTER — TELEPHONE (OUTPATIENT)
Dept: OTOLARYNGOLOGY | Facility: CLINIC | Age: 34
End: 2019-03-06

## 2020-03-04 NOTE — PATIENT INSTRUCTIONS
-- DO NOT REPLY / DO NOT REPLY ALL --  -- Message is from the [facility]--    General Patient Message      Reason for Call: Patient is returning call regarding results. Please advise.    Caller Information       Type Contact Phone    03/04/2020 01:52 PM Phone (Outgoing) Camden Iyer (Self) [phone] (M)    Left Message           Alternative phone number: None    Turnaround time given to caller:   \"This message will be sent to [state Provider's name]. The clinical team will fulfill your request as soon as they review your message.\"}     Scheduling Information  To schedule your CT/MRI scan, please contact [facility] at [phone] OR [facility] at [phone]    To schedule your Surgery, please contact our Specialty Schedulers at [phone]      ENT Clinic Locations Clinic Hours Telephone Number     Ann Balderas  [address] [place] [address]   Monday:           1:00pm -- 5:00pm    Friday:              8:00am - 12:00pm   To schedule/reschedule an appointment with   Dr. Bashir,   please contact our   Specialty Scheduling Department at:     [phone]       Ann Dejesus  [address] [place] [address] Tuesday:          8:00am -- 2:00pm         Urgent Care Locations Clinic Hours Telephone Numbers     Ann Dejesus  [address] [place] [address]     Monday-Friday:     11:00am - 9:00pm    Saturday-Sunday:  9:00am - 5:00pm   [phone]     [facility]  [address] [place] [address]     Monday-Friday:      5:00pm - 9:00pm     Saturday-Sunday:  9:00am - 5:00pm   [phone]

## 2021-03-18 ENCOUNTER — APPOINTMENT (OUTPATIENT)
Dept: INTERPRETER SERVICES | Facility: CLINIC | Age: 36
End: 2021-03-18
Payer: COMMERCIAL

## 2021-03-31 ENCOUNTER — OFFICE VISIT (OUTPATIENT)
Dept: FAMILY MEDICINE | Facility: CLINIC | Age: 36
End: 2021-03-31
Payer: COMMERCIAL

## 2021-03-31 VITALS
DIASTOLIC BLOOD PRESSURE: 84 MMHG | HEART RATE: 77 BPM | HEIGHT: 67 IN | SYSTOLIC BLOOD PRESSURE: 134 MMHG | BODY MASS INDEX: 32.18 KG/M2 | WEIGHT: 205 LBS | OXYGEN SATURATION: 98 %

## 2021-03-31 DIAGNOSIS — Z00.00 ROUTINE GENERAL MEDICAL EXAMINATION AT A HEALTH CARE FACILITY: Primary | ICD-10-CM

## 2021-03-31 DIAGNOSIS — R39.12 WEAK URINARY STREAM: ICD-10-CM

## 2021-03-31 DIAGNOSIS — Z13.1 SCREENING FOR DIABETES MELLITUS: ICD-10-CM

## 2021-03-31 DIAGNOSIS — Z13.6 CARDIOVASCULAR SCREENING; LDL GOAL LESS THAN 160: ICD-10-CM

## 2021-03-31 DIAGNOSIS — Z11.4 SCREENING FOR HIV (HUMAN IMMUNODEFICIENCY VIRUS): ICD-10-CM

## 2021-03-31 DIAGNOSIS — Z11.59 NEED FOR HEPATITIS C SCREENING TEST: ICD-10-CM

## 2021-03-31 LAB
ALBUMIN SERPL-MCNC: 3.9 G/DL (ref 3.4–5)
ALP SERPL-CCNC: 73 U/L (ref 40–150)
ALT SERPL W P-5'-P-CCNC: 85 U/L (ref 0–70)
ANION GAP SERPL CALCULATED.3IONS-SCNC: 7 MMOL/L (ref 3–14)
AST SERPL W P-5'-P-CCNC: 51 U/L (ref 0–45)
BILIRUB SERPL-MCNC: 0.6 MG/DL (ref 0.2–1.3)
BUN SERPL-MCNC: 11 MG/DL (ref 7–30)
CALCIUM SERPL-MCNC: 9 MG/DL (ref 8.5–10.1)
CHLORIDE SERPL-SCNC: 104 MMOL/L (ref 94–109)
CHOLEST SERPL-MCNC: 258 MG/DL
CO2 SERPL-SCNC: 27 MMOL/L (ref 20–32)
CREAT SERPL-MCNC: 0.76 MG/DL (ref 0.66–1.25)
GFR SERPL CREATININE-BSD FRML MDRD: >90 ML/MIN/{1.73_M2}
GLUCOSE SERPL-MCNC: 103 MG/DL (ref 70–99)
HDLC SERPL-MCNC: 49 MG/DL
LDLC SERPL CALC-MCNC: ABNORMAL MG/DL
LDLC SERPL DIRECT ASSAY-MCNC: 153 MG/DL
NONHDLC SERPL-MCNC: 209 MG/DL
POTASSIUM SERPL-SCNC: 4 MMOL/L (ref 3.4–5.3)
PROT SERPL-MCNC: 7.5 G/DL (ref 6.8–8.8)
SODIUM SERPL-SCNC: 138 MMOL/L (ref 133–144)
TRIGL SERPL-MCNC: 437 MG/DL

## 2021-03-31 PROCEDURE — 36415 COLL VENOUS BLD VENIPUNCTURE: CPT | Performed by: FAMILY MEDICINE

## 2021-03-31 PROCEDURE — 83721 ASSAY OF BLOOD LIPOPROTEIN: CPT | Performed by: FAMILY MEDICINE

## 2021-03-31 PROCEDURE — 80053 COMPREHEN METABOLIC PANEL: CPT | Performed by: FAMILY MEDICINE

## 2021-03-31 PROCEDURE — 87389 HIV-1 AG W/HIV-1&-2 AB AG IA: CPT | Performed by: FAMILY MEDICINE

## 2021-03-31 PROCEDURE — 80061 LIPID PANEL: CPT | Performed by: FAMILY MEDICINE

## 2021-03-31 PROCEDURE — 99395 PREV VISIT EST AGE 18-39: CPT | Performed by: FAMILY MEDICINE

## 2021-03-31 PROCEDURE — 86803 HEPATITIS C AB TEST: CPT | Performed by: FAMILY MEDICINE

## 2021-03-31 ASSESSMENT — MIFFLIN-ST. JEOR: SCORE: 1828.5

## 2021-03-31 ASSESSMENT — PAIN SCALES - GENERAL: PAINLEVEL: MODERATE PAIN (5)

## 2021-03-31 NOTE — LETTER
April 1, 2021      Zoran Dixon  [address]  [place] [address]        Dear Zoran,     Your cholesterol was elevated. Please try to work on low fat diet and regular exercise to help lower this. Your liver tests, AST/ALT, were mildly elevated. This can be caused by weight gain causing fatty liver, recent illness, medications, alcohol. We will recheck this at your next physical. Your blood sugar, glucose, was mildly elevated in the prediabetes range. Weight loss can help with sugar control and to decrease risk for developing diabetes in the future. Your kidney, electrolyte tests were normal. Your HIV and hepatitis c screening tests were negative.     Sincerely,   Pradip Mclean MD    Resulted Orders   HIV Antigen Antibody Combo   Result Value Ref Range    HIV Antigen Antibody Combo Nonreactive NR^Nonreactive          Comment:      HIV-1 p24 Ag & HIV-1/HIV-2 Ab Not Detected   Hepatitis C Screen Reflex to HCV RNA Quant and Genotype   Result Value Ref Range    Hepatitis C Antibody Nonreactive NR^Nonreactive      Comment:      Assay performance characteristics have not been established for newborns,   infants, and children     Comprehensive metabolic panel (BMP + Alb, Alk Phos, ALT, AST, Total. Bili, TP)   Result Value Ref Range    Sodium 138 133 - 144 mmol/L    Potassium 4.0 3.4 - 5.3 mmol/L    Chloride 104 94 - 109 mmol/L    Carbon Dioxide 27 20 - 32 mmol/L    Anion Gap 7 3 - 14 mmol/L    Glucose 103 (H) 70 - 99 mg/dL      Comment:      Fasting specimen    Urea Nitrogen 11 7 - 30 mg/dL    Creatinine 0.76 0.66 - 1.25 mg/dL    GFR Estimate >90 >60 mL/min/[1.73_m2]      Comment:      Non  GFR Calc  Starting 12/18/2018, serum creatinine based estimated GFR (eGFR) will be   calculated using the Chronic Kidney Disease Epidemiology Collaboration   (CKD-EPI) equation.      GFR Estimate If Black >90 >60 mL/min/[1.73_m2]      Comment:       GFR Calc  Starting 12/18/2018, serum creatinine  based estimated GFR (eGFR) will be   calculated using the Chronic Kidney Disease Epidemiology Collaboration   (CKD-EPI) equation.      Calcium 9.0 8.5 - 10.1 mg/dL    Bilirubin Total 0.6 0.2 - 1.3 mg/dL    Albumin 3.9 3.4 - 5.0 g/dL    Protein Total 7.5 6.8 - 8.8 g/dL    Alkaline Phosphatase 73 40 - 150 U/L    ALT 85 (H) 0 - 70 U/L    AST 51 (H) 0 - 45 U/L   Lipid panel reflex to direct LDL Fasting   Result Value Ref Range    Cholesterol 258 (H) <200 mg/dL      Comment:      Desirable:       <200 mg/dl    Triglycerides 437 (H) <150 mg/dL      Comment:      Borderline high:  150-199 mg/dl  High:             200-499 mg/dl  Very high:       >499 mg/dl  Fasting specimen      HDL Cholesterol 49 >39 mg/dL    LDL Cholesterol Calculated  <100 mg/dL     Cannot estimate LDL when triglyceride exceeds 400 mg/dL    Non HDL Cholesterol 209 (H) <130 mg/dL      Comment:      Above Desirable:  130-159 mg/dl  Borderline high:  160-189 mg/dl  High:             190-219 mg/dl  Very high:       >219 mg/dl     LDL cholesterol direct   Result Value Ref Range    LDL Cholesterol Direct 153 (H) <100 mg/dL      Comment:      Above desirable:  100-129 mg/dl  Borderline High:  130-159 mg/dL  High:             160-189 mg/dL  Very high:       >189 mg/dl

## 2021-03-31 NOTE — PROGRESS NOTES
3  SUBJECTIVE:   CC: Zoran Dixon is an 35 year old male who presents for preventive health visit.       Patient has been advised of split billing requirements and indicates understanding: Yes  Healthy Habits:    Do you get at least three servings of calcium containing foods daily (dairy, green leafy vegetables, etc.)? no    Amount of exercise or daily activities, outside of work: non    Problems taking medications regularly not applicable    Medication side effects: No    Have you had an eye exam in the past two years? no    Do you see a dentist twice per year? no    Do you have sleep apnea, excessive snoring or daytime drowsiness?no    Patient stated he is experiencing intermittent weak urinary stream. No dysuria. No blood in the urine. No abdominal pain.    Today's PHQ-2 Score:   PHQ-2 ( 1999 [employer]) 3/31/2021 2/14/2019   Q1: Little interest or pleasure in doing things 0 0   Q2: Feeling down, depressed or hopeless 0 0   PHQ-2 Score 0 0       Abuse: Current or Past(Physical, Sexual or Emotional)- NO  Do you feel safe in your environment? YES    Have you ever done Advance Care Planning? (For example, a Health Directive, POLST, or a discussion with a medical provider or your loved ones about your wishes): No, advance care planning information given to patient to review.  Patient plans to discuss their wishes with loved ones or provider.      Social History     Tobacco Use     Smoking status: Former Smoker     Types: Cigarettes     Quit date: 12/1/2017     Years since quitting: 3.3     Smokeless tobacco: Never Used   Substance Use Topics     Alcohol use: Yes     Comment: occasionally     If you drink alcohol do you typically have >3 drinks per day or >7 drinks per week? No                      Last PSA: No results found for: PSA    Reviewed orders with patient. Reviewed health maintenance and updated orders accordingly - Yes  Lab work is in process  Labs reviewed in EPIC  BP Readings from Last 3 Encounters:    03/31/21 134/84   03/05/19 (!) 150/96   02/14/19 124/81    Wt Readings from Last 3 Encounters:   03/31/21 93 kg (205 lb)   03/05/19 88.5 kg (195 lb)   02/14/19 87.8 kg (193 lb 9.6 oz)                  Patient Active Problem List   Diagnosis     Pain in joint, lower leg     CARDIOVASCULAR SCREENING; LDL GOAL LESS THAN 160     Obesity (BMI 30.0-34.9)     Alcohol consumption binge drinking     Congenital abnormality of limb     BENJI (obstructive sleep apnea)     Deviated nasal septum     Nasal obstruction     Adenoid hypertrophy     Past Surgical History:   Procedure Laterality Date     SEPTOPLASTY, TURBINOPLASTY, COMBINED N/A 3/27/2018    Procedure: COMBINED SEPTOPLASTY, TURBINOPLASTY;;  Surgeon: Jesse Bashir MD;  Location:  OR     TONSILLECTOMY, ADENOIDECTOMY, COMBINED Bilateral 3/27/2018    Procedure: COMBINED TONSILLECTOMY, ADENOIDECTOMY;  Bilateral tonsillectomy, adenoidectomy, Uvulectomy, Endoscopic septoplasty with turbinate reduction;  Surgeon: Jesse Bashir MD;  Location:  OR       Social History     Tobacco Use     Smoking status: Former Smoker     Types: Cigarettes     Quit date: 12/1/2017     Years since quitting: 3.3     Smokeless tobacco: Never Used   Substance Use Topics     Alcohol use: Yes     Comment: occasionally     Family History   Problem Relation Age of Onset     Heart Disease Maternal Grandfather      Heart Disease Maternal Uncle          No current outpatient medications on file.     No Known Allergies    Reviewed and updated as needed this visit by clinical staff  Tobacco  Allergies  Meds  Problems  Med Hx  Surg Hx  Fam Hx  Soc Hx          Reviewed and updated as needed this visit by Provider                    ROS:  CONSTITUTIONAL: NEGATIVE for fever, chills, change in weight  INTEGUMENTARY/SKIN: NEGATIVE for worrisome rashes, moles or lesions  EYES: NEGATIVE for vision changes or irritation  ENT: NEGATIVE for ear, mouth and throat problems  RESP: NEGATIVE for  "significant cough or SOB  CV: NEGATIVE for chest pain, palpitations or peripheral edema  GI: NEGATIVE for nausea, abdominal pain, heartburn, or change in bowel habits   male: negative for dysuria, hematuria, decreased urinary stream, erectile dysfunction, urethral discharge  MUSCULOSKELETAL: NEGATIVE for significant arthralgias or myalgia  NEURO: NEGATIVE for weakness, dizziness or paresthesias  PSYCHIATRIC: NEGATIVE for changes in mood or affect    OBJECTIVE:   /84   Pulse 77   Ht 1.702 m (5' 7\")   Wt 93 kg (205 lb)   SpO2 98%   BMI 32.11 kg/m    EXAM:  GENERAL: healthy, alert and no distress  NECK: no adenopathy, no asymmetry, masses, or scars and thyroid normal to palpation  RESP: lungs clear to auscultation - no rales, rhonchi or wheezes  CV: regular rate and rhythm, normal S1 S2, no S3 or S4, no murmur, click or rub, no peripheral edema and peripheral pulses strong  ABDOMEN: soft, nontender, no hepatosplenomegaly, no masses and bowel sounds normal  MS: no gross musculoskeletal defects noted, no edema    Diagnostic Test Results:  Labs reviewed in Epic    ASSESSMENT/PLAN:   1. Routine general medical examination at a health care facility  As below.    2. CARDIOVASCULAR SCREENING; LDL GOAL LESS THAN 160    - Comprehensive metabolic panel (BMP + Alb, Alk Phos, ALT, AST, Total. Bili, TP)  - Lipid panel reflex to direct LDL Fasting    3. Screening for diabetes mellitus    - Comprehensive metabolic panel (BMP + Alb, Alk Phos, ALT, AST, Total. Bili, TP)    4. Screening for HIV (human immunodeficiency virus)    - HIV Antigen Antibody Combo    5. Need for hepatitis C screening test    - Hepatitis C Screen Reflex to HCV RNA Quant and Genotype    6. Weak urinary stream  Enlarged prostate? Referred to Urology for evaluation and recommendations.  - UROLOGY ADULT REFERRAL; Future    Patient has been advised of split billing requirements and indicates understanding: Yes  COUNSELING:  Reviewed preventive health " "counseling, as reflected in patient instructions       Regular exercise       Healthy diet/nutrition       Vision screening    Estimated body mass index is 32.11 kg/m  as calculated from the following:    Height as of this encounter: 1.702 m (5' 7\").    Weight as of this encounter: 93 kg (205 lb).        He reports that he quit smoking about 3 years ago. His smoking use included cigarettes. He has never used smokeless tobacco.      Counseling Resources:  ATP IV Guidelines  Pooled Cohorts Equation Calculator  FRAX Risk Assessment  ICSI Preventive Guidelines  Dietary Guidelines for Americans, 2010  USDA's MyPlate  ASA Prophylaxis  Lung CA Screening    Pradip Mclean MD, MD  [facility]  "

## 2021-03-31 NOTE — PATIENT INSTRUCTIONS
At [facility], we strive to deliver an exceptional experience to you, every time we see you. If you receive a survey, please complete it as we do value your feedback.  If you have MyChart, you can expect to receive results automatically within 24 hours of their completion.  Your provider will send a note interpreting your results as well.   If you do not have MyChart, you should receive your results in about a week by mail.    Your care team:                            Family Medicine Internal Medicine   MD Landon Flanagan MD Shantel Branch-Fleming, MD Srinivasa Vaka, MD Katya Belousova, PAAdyC  Funmi Manrique, APRN CNP    Pradip Mclean, MD Pediatrics   Ethan Burgos, PABETSY Singh, CNP MD Natalie Arango APRN CNP   MD Mary Blood MD Deborah Mielke, MD Roro Sorensen, APRN [facility]      Clinic hours: Monday - Thursday 7 am-6 pm; Fridays 7 am-5 pm.   Urgent care: Monday - Friday 11 am-9 pm; Saturday and Sunday 9 am-5 pm.    Clinic: [phone]       [facility]: Monday - Thursday 8 am - 7 pm; Friday 8 am - 6 pm  [facility]: [phone]     Use www.[email or web address] for 24/7 diagnosis and treatment of dozens of conditions.    Preventive Health Recommendations  Male Ages 26 - 39    Yearly exam:             See your health care provider every year in order to  o   Review health changes.   o   Discuss preventive care.    o   Review your medicines if your doctor has prescribed any.    You should be tested each year for STDs (sexually transmitted diseases), if you re at risk.     After age 35, talk to your provider about cholesterol testing. If you are at risk for heart disease, have your cholesterol tested at least every 5 years.     If you are at risk for diabetes, you should have a diabetes test (fasting glucose).  Shots: Get a flu shot each year. Get a tetanus shot every 10 years.      Nutrition:    Eat at least 5 servings of fruits and vegetables daily.     Eat whole-grain bread, whole-wheat pasta and brown rice instead of white grains and rice.     Get adequate Calcium and Vitamin D.     Lifestyle    Exercise for at least 150 minutes a week (30 minutes a day, 5 days a week). This will help you control your weight and prevent disease.     Limit alcohol to one drink per day.     No smoking.     Wear sunscreen to prevent skin cancer.     See your dentist every six months for an exam and cleaning.

## 2021-04-01 LAB
HCV AB SERPL QL IA: NONREACTIVE
HIV 1+2 AB+HIV1 P24 AG SERPL QL IA: NONREACTIVE

## 2021-04-05 ENCOUNTER — APPOINTMENT (OUTPATIENT)
Dept: INTERPRETER SERVICES | Facility: CLINIC | Age: 36
End: 2021-04-05
Payer: COMMERCIAL

## 2021-04-29 ENCOUNTER — APPOINTMENT (OUTPATIENT)
Dept: INTERPRETER SERVICES | Facility: CLINIC | Age: 36
End: 2021-04-29
Payer: COMMERCIAL

## 2021-05-21 ENCOUNTER — APPOINTMENT (OUTPATIENT)
Dept: INTERPRETER SERVICES | Facility: CLINIC | Age: 36
End: 2021-05-21
Payer: COMMERCIAL

## 2021-07-14 ENCOUNTER — APPOINTMENT (OUTPATIENT)
Dept: INTERPRETER SERVICES | Facility: CLINIC | Age: 36
End: 2021-07-14
Payer: COMMERCIAL

## 2021-07-14 ENCOUNTER — TELEPHONE (OUTPATIENT)
Dept: FAMILY MEDICINE | Facility: CLINIC | Age: 36
End: 2021-07-14

## 2021-07-14 NOTE — TELEPHONE ENCOUNTER
Called patient via a  and updated him on result note below.  He stated that he never received the results in the mail  Patient verified his address    - please mail out another copy of 3/31/2021 labs & result note      - Ana Bob MA   4/1/2021 10:05 AM CDT Back to Top      Sent letter with results.  Ana [facility]ACOBS [facility]  2nd Floor  Primary Care   - Pradip Mclean MD   4/1/2021  8:50 AM CDT       Dear Zoran,     Your cholesterol was elevated. Please try to work on low fat diet and regular exercise to help lower this. Your liver tests, AST/ALT, were mildly elevated. This can be caused by weight gain causing fatty liver, recent illness, medications, alcohol. We will recheck this at your next physical. Your blood sugar, glucose, was mildly elevated in the prediabetes range. Weight loss can help with sugar control and to decrease risk for developing diabetes in the future. Your kidney, electrolyte tests were normal. Your HIV and hepatitis c screening tests were negative.     Sincerely,  MD Chavez Wang RN  [facility]

## 2021-07-14 NOTE — TELEPHONE ENCOUNTER
Reason for Call:  Results of physical    Detailed comments: patient was seen in march and wants results of his physical    Phone Number Patient can be reached at: Cell number on file:    Telephone Information:   Mobile [phone]       Best Time: in the mornings    Can we leave a detailed message on this number? YES    Call taken on 7/14/2021 at 10:49 AM by Yue Ballard

## 2021-07-14 NOTE — TELEPHONE ENCOUNTER
Printed letter and mailed to the patient's home address.  Ana Bob MA  [facility]  2nd Floor  Primary Care

## 2022-03-24 ENCOUNTER — APPOINTMENT (OUTPATIENT)
Dept: INTERPRETER SERVICES | Facility: CLINIC | Age: 37
End: 2022-03-24
Payer: COMMERCIAL

## 2022-04-13 ENCOUNTER — OFFICE VISIT (OUTPATIENT)
Dept: FAMILY MEDICINE | Facility: CLINIC | Age: 37
End: 2022-04-13
Payer: COMMERCIAL

## 2022-04-13 VITALS
SYSTOLIC BLOOD PRESSURE: 136 MMHG | OXYGEN SATURATION: 98 % | WEIGHT: 207.6 LBS | HEART RATE: 73 BPM | DIASTOLIC BLOOD PRESSURE: 90 MMHG | RESPIRATION RATE: 18 BRPM | BODY MASS INDEX: 31.46 KG/M2 | HEIGHT: 68 IN | TEMPERATURE: 97.9 F

## 2022-04-13 DIAGNOSIS — Z13.1 SCREENING FOR DIABETES MELLITUS: ICD-10-CM

## 2022-04-13 DIAGNOSIS — Z23 ENCOUNTER FOR IMMUNIZATION: ICD-10-CM

## 2022-04-13 DIAGNOSIS — Z91.030 BEE STING ALLERGY: ICD-10-CM

## 2022-04-13 DIAGNOSIS — Z00.00 ROUTINE GENERAL MEDICAL EXAMINATION AT A HEALTH CARE FACILITY: Primary | ICD-10-CM

## 2022-04-13 DIAGNOSIS — Z13.6 CARDIOVASCULAR SCREENING; LDL GOAL LESS THAN 160: ICD-10-CM

## 2022-04-13 DIAGNOSIS — R79.89 ELEVATED LFTS: ICD-10-CM

## 2022-04-13 LAB
ALBUMIN SERPL-MCNC: 4.1 G/DL (ref 3.4–5)
ALP SERPL-CCNC: 80 U/L (ref 40–150)
ALT SERPL W P-5'-P-CCNC: 80 U/L (ref 0–70)
ANION GAP SERPL CALCULATED.3IONS-SCNC: 6 MMOL/L (ref 3–14)
AST SERPL W P-5'-P-CCNC: 40 U/L (ref 0–45)
BILIRUB SERPL-MCNC: 0.8 MG/DL (ref 0.2–1.3)
BUN SERPL-MCNC: 13 MG/DL (ref 7–30)
CALCIUM SERPL-MCNC: 9.4 MG/DL (ref 8.5–10.1)
CHLORIDE BLD-SCNC: 104 MMOL/L (ref 94–109)
CHOLEST SERPL-MCNC: 206 MG/DL
CO2 SERPL-SCNC: 28 MMOL/L (ref 20–32)
CREAT SERPL-MCNC: 0.78 MG/DL (ref 0.66–1.25)
FASTING STATUS PATIENT QL REPORTED: NO
GFR SERPL CREATININE-BSD FRML MDRD: >90 ML/MIN/1.73M2
GLUCOSE BLD-MCNC: 102 MG/DL (ref 70–99)
HBA1C MFR BLD: 5.3 % (ref 0–5.6)
HDLC SERPL-MCNC: 35 MG/DL
LDLC SERPL CALC-MCNC: 109 MG/DL
LDLC SERPL CALC-MCNC: ABNORMAL MG/DL
NONHDLC SERPL-MCNC: 171 MG/DL
POTASSIUM BLD-SCNC: 3.8 MMOL/L (ref 3.4–5.3)
PROT SERPL-MCNC: 8.2 G/DL (ref 6.8–8.8)
SODIUM SERPL-SCNC: 138 MMOL/L (ref 133–144)
TRIGL SERPL-MCNC: 462 MG/DL

## 2022-04-13 PROCEDURE — 83721 ASSAY OF BLOOD LIPOPROTEIN: CPT | Mod: 59 | Performed by: FAMILY MEDICINE

## 2022-04-13 PROCEDURE — 36415 COLL VENOUS BLD VENIPUNCTURE: CPT | Performed by: FAMILY MEDICINE

## 2022-04-13 PROCEDURE — 80053 COMPREHEN METABOLIC PANEL: CPT | Performed by: FAMILY MEDICINE

## 2022-04-13 PROCEDURE — 80061 LIPID PANEL: CPT | Performed by: FAMILY MEDICINE

## 2022-04-13 PROCEDURE — 87340 HEPATITIS B SURFACE AG IA: CPT | Performed by: FAMILY MEDICINE

## 2022-04-13 PROCEDURE — 0054A COVID-19,PF,PFIZER (12+ YRS): CPT | Performed by: FAMILY MEDICINE

## 2022-04-13 PROCEDURE — 91305 COVID-19,PF,PFIZER (12+ YRS): CPT | Performed by: FAMILY MEDICINE

## 2022-04-13 PROCEDURE — 83036 HEMOGLOBIN GLYCOSYLATED A1C: CPT | Performed by: FAMILY MEDICINE

## 2022-04-13 PROCEDURE — 99395 PREV VISIT EST AGE 18-39: CPT | Mod: 25 | Performed by: FAMILY MEDICINE

## 2022-04-13 RX ORDER — EPINEPHRINE 0.3 MG/.3ML
0.3 INJECTION SUBCUTANEOUS PRN
Qty: 1 EACH | Refills: 3 | Status: SHIPPED | OUTPATIENT
Start: 2022-04-13

## 2022-04-13 ASSESSMENT — ENCOUNTER SYMPTOMS
HEMATOCHEZIA: 0
EYE PAIN: 0
NERVOUS/ANXIOUS: 1
JOINT SWELLING: 0
FEVER: 0
DIZZINESS: 0
ABDOMINAL PAIN: 0
NAUSEA: 0
DIARRHEA: 0
PARESTHESIAS: 0
HEMATURIA: 0
MYALGIAS: 0
CONSTIPATION: 0
HEADACHES: 1
COUGH: 0
CHILLS: 0
SHORTNESS OF BREATH: 1
WEAKNESS: 1
PALPITATIONS: 1
SORE THROAT: 1
FREQUENCY: 0
DYSURIA: 0

## 2022-04-13 ASSESSMENT — PAIN SCALES - GENERAL: PAINLEVEL: NO PAIN (0)

## 2022-04-13 NOTE — LETTER
April 14, 2022      Zoran Dixon  [address]  [place] [address]        Dear Zoran,     Your cholesterol was mildly elevated with the elevated triglycerides. Low fat diet and regular exercise can help lower this. Your blood sugar tests were normal. You do not have diabetes but continue to try to work on weight loss to decrease risk to develop diabetes in the future. Your kidney, liver and electrolyte tests were normal. Your hepatitis b test was negative. Please follow up in 1 year for routine physical for recheck.     Sincerely,   Pradip Mclean MD       Resulted Orders   Comprehensive metabolic panel (BMP + Alb, Alk Phos, ALT, AST, Total. Bili, TP)   Result Value Ref Range    Sodium 138 133 - 144 mmol/L    Potassium 3.8 3.4 - 5.3 mmol/L    Chloride 104 94 - 109 mmol/L    Carbon Dioxide (CO2) 28 20 - 32 mmol/L    Anion Gap 6 3 - 14 mmol/L    Urea Nitrogen 13 7 - 30 mg/dL    Creatinine 0.78 0.66 - 1.25 mg/dL    Calcium 9.4 8.5 - 10.1 mg/dL    Glucose 102 (H) 70 - 99 mg/dL    Alkaline Phosphatase 80 40 - 150 U/L    AST 40 0 - 45 U/L    ALT 80 (H) 0 - 70 U/L    Protein Total 8.2 6.8 - 8.8 g/dL    Albumin 4.1 3.4 - 5.0 g/dL    Bilirubin Total 0.8 0.2 - 1.3 mg/dL    GFR Estimate >90 >60 mL/min/1.73m2      Comment:      Effective December 21, 2021 eGFRcr in adults is calculated using the 2021 CKD-EPI creatinine equation which includes age and gender (Yuko et al., NEJ, DOI: 10.1056/RVWMfj1173321)   Hemoglobin A1c   Result Value Ref Range    Hemoglobin A1C 5.3 0.0 - 5.6 %      Comment:      Normal <5.7%   Prediabetes 5.7-6.4%    Diabetes 6.5% or higher     Note: Adopted from ADA consensus guidelines.   Lipid panel reflex to direct LDL Fasting   Result Value Ref Range    Cholesterol 206 (H) <200 mg/dL    Triglycerides 462 (H) <150 mg/dL    Direct Measure HDL 35 (L) >=40 mg/dL    LDL Cholesterol Calculated        Comment:      Cannot estimate LDL when triglyceride exceeds 400 mg/dL    Non HDL Cholesterol 171 (H)  <130 mg/dL    Patient Fasting > 8hrs? No     Narrative    Cholesterol  Desirable:  <200 mg/dL    Triglycerides  Normal:  Less than 150 mg/dL  Borderline High:  150-199 mg/dL  High:  200-499 mg/dL  Very High:  Greater than or equal to 500 mg/dL    Direct Measure HDL  Female:  Greater than or equal to 50 mg/dL   Male:  Greater than or equal to 40 mg/dL    LDL Cholesterol  Desirable:  <100mg/dL  Above Desirable:  100-129 mg/dL   Borderline High:  130-159 mg/dL   High:  160-189 mg/dL   Very High:  >= 190 mg/dL    Non HDL Cholesterol  Desirable:  130 mg/dL  Above Desirable:  130-159 mg/dL  Borderline High:  160-189 mg/dL  High:  190-219 mg/dL  Very High:  Greater than or equal to 220 mg/dL   Hepatitis B surface antigen   Result Value Ref Range    Hepatitis B Surface Antigen Nonreactive Nonreactive   LDL cholesterol direct   Result Value Ref Range    LDL Cholesterol Direct 109 (H) <100 mg/dL      Comment:      Age 0-19 years:  Desirable: 0-110 mg/dL   Borderline high: 110-129 mg/dL   High: >= 130 mg/dL    Age 20 years and older:  Desirable: <100mg/dL  Above desirable: 100-129 mg/dL   Borderline high: 130-159 mg/dL   High: 160-189 mg/dL   Very high: >= 190 mg/dL

## 2022-04-13 NOTE — PROGRESS NOTES
Progress Note - Rosina Hinduism 1944, 69 y o  male MRN: [id number]    Unit/Bed#: E2 -01 Encounter: [id number]    Primary Care Provider: Karen Peoples MD   Date and time admitted to hospital: 11/28/2018  8:26 AM        * Orthostatic lightheadedness   Assessment & Plan    Patient is orthostatic as evident with SBP positional drop of >10 and elevation of HR  Received IV NS bolus in ED  Continue on gentle hydration  Hold home amlodipine  Monitor orthostatic VS  PT/OT  Consider blood transfusion     SIRS (systemic inflammatory response syndrome) (HCC)   Assessment & Plan    POA and resolved  No evidence of an infection, urine cultures negative  Tachycardia was likely related to orthostasis and leukopenia related to chemothearpy  Monitor VS/CBC     Chemotherapy-induced neutropenia (HCC)   Assessment & Plan    Mild with ANC 1 2 on admission, WBC count improved  Monitor CBC       Acute cystitis without hematuria   Assessment & Plan    Ruled out  Urine cultures no growth  Patient's urinary symptoms most likely related to his urothelial cancer     Acute on chronic anemia   Assessment & Plan    Possibly related to lightheadedness and orthostasis  Likely related to chemo  Will check heme stool occult  Anemia workup suggestive of anemia of chronic disease  Transfuse for Hb < 7     CKD (chronic kidney disease) stage 3, GFR 30-59 ml/min ([facility])   Assessment & Plan    Baseline Cr 1 3-1 4  Cr within baseline  Monitor BMP  Avoid nephrotoxins/renally dose meds     Hypokalemia   Assessment & Plan    Continue oral replacement  Low Mg and will replace  Check BMP/Mg in am     Prostate cancer [facility])   Assessment & Plan    Patient follows with Urology Dr Hilda Enamorado and Hem/Onc Dr Paramjit Lockwood  He is on combination chemo with cisplatin and gemcitabine, last treatment Wed 11/21 - receives Rx weekly x 3 wks then 4th week holiday  Consider evaluation from Hem/Onc based on clinical course     Urothelial carcinoma [facility])   Assessment & Plan SUBJECTIVE:   CC: Zoran Dixon is an 36 year old male who presents for preventative health visit.       Patient has been advised of split billing requirements and indicates understanding: Yes  Healthy Habits:     Getting at least 3 servings of Calcium per day:  Yes    Bi-annual eye exam:  NO    Dental care twice a year:  NO    Sleep apnea or symptoms of sleep apnea:  None    Diet:  Regular (no restrictions)    Frequency of exercise:  None    Taking medications regularly:  Yes    Medication side effects:  None    PHQ-2 Total Score: 0    Additional concerns today:  Yes      Today's PHQ-2 Score:   PHQ-2 (  [employer]) 2022   Q1: Little interest or pleasure in doing things 0   Q2: Feeling down, depressed or hopeless 0   PHQ-2 Score 0   PHQ-2 Total Score (12-17 Years)- Positive if 3 or more points; Administer PHQ-A if positive -   Q1: Little interest or pleasure in doing things Not at all   Q2: Feeling down, depressed or hopeless Not at all   PHQ-2 Score 0       Abuse: Current or Past(Physical, Sexual or Emotional)- No  Do you feel safe in your environment? Yes        Social History     Tobacco Use     Smoking status: Former Smoker     Types: Cigarettes     Quit date: 2017     Years since quittin.3     Smokeless tobacco: Never Used   Substance Use Topics     Alcohol use: Yes     Comment: occasionally     If you drink alcohol do you typically have >3 drinks per day or >7 drinks per week? No    Alcohol Use 2022   Prescreen: >3 drinks/day or >7 drinks/week? Not Applicable   No flowsheet data found.    Last PSA: No results found for: PSA    Reviewed orders with patient. Reviewed health maintenance and updated orders accordingly - Yes  Lab work is in process  Labs reviewed in EPIC  BP Readings from Last 3 Encounters:   22 (!) 136/90   21 134/84   19 (!) 150/96    Wt Readings from Last 3 Encounters:   22 94.2 kg (207 lb 9.6 oz)   21 93 kg (205 lb)   19 88.5 kg (195  Patient follows with Urology  He is to have surgery for bladder and prostate removal after chemotherapy  Outpatient Urology follow up     Peripheral vascular disease ([facility] [address] )   Assessment & Plan    With history of Buerger's disease s/p left TMA  Has findings of left popliteal aneurysm  Has LLE claudication pain with LLE TRENTON 10/5 0 57  Will need outpatient Vascular Surgery monitoring         VTE Pharmacologic Prophylaxis:   Pharmacologic: Enoxaparin (Lovenox)  Mechanical VTE Prophylaxis in Place: Yes    Patient Centered Rounds: I have performed bedside rounds with nursing staff today  Discussions with Specialists or Other Care Team Provider: reviewed chart    Education and Discussions with Family / Patient: Patient and his wife    Time Spent for Care: 30 minutes  More than 50% of total time spent on counseling and coordination of care as described above  Current Length of Stay: 1 day(s)    Current Patient Status: Inpatient   Certification Statement: The patient will continue to require additional inpatient hospital stay due to need for close monitoring    Discharge Plan: TBD    Code Status: Level 1 - Full Code      Subjective:   Patient seen and examined  He states that his lightheadedness has mostly resolved  His main complaint is neck pain and tenderness  Denies chest pain, SOB or palpitations  Afebrile  No o/n events  Objective:     Vitals:   Temp (24hrs), Av 7 °F (37 1 °C), Min:98 °F (36 7 °C), Max:99 7 °F (37 6 °C)    Temp:  [98 °F (36 7 °C)-99 7 °F (37 6 °C)] 98 °F (36 7 °C)  HR:  [74-86] 76  Resp:  [18] 18  BP: (106-137)/(61-73) 106/68  SpO2:  [95 %-96 %] 96 %  Body mass index is 29 33 kg/m²  Input and Output Summary (last 24 hours):        Intake/Output Summary (Last 24 hours) at 18 1611  Last data filed at 18 1550   Gross per 24 hour   Intake          1011 67 ml   Output              625 ml   Net           386 67 ml       Physical Exam:     Physical Exam   Constitutional: He is lb)                  Patient Active Problem List   Diagnosis     Pain in joint, lower leg     CARDIOVASCULAR SCREENING; LDL GOAL LESS THAN 160     Obesity (BMI 30.0-34.9)     Alcohol consumption binge drinking     Congenital abnormality of limb     BENJI (obstructive sleep apnea)     Deviated nasal septum     Nasal obstruction     Adenoid hypertrophy     Past Surgical History:   Procedure Laterality Date     SEPTOPLASTY, TURBINOPLASTY, COMBINED N/A 3/27/2018    Procedure: COMBINED SEPTOPLASTY, TURBINOPLASTY;;  Surgeon: Jesse Bashir MD;  Location: [facility] OR     TONSILLECTOMY, ADENOIDECTOMY, COMBINED Bilateral 3/27/2018    Procedure: COMBINED TONSILLECTOMY, ADENOIDECTOMY;  Bilateral tonsillectomy, adenoidectomy, Uvulectomy, Endoscopic septoplasty with turbinate reduction;  Surgeon: Jesse Bashir MD;  Location: [facility] OR       Social History     Tobacco Use     Smoking status: Former Smoker     Types: Cigarettes     Quit date: 2017     Years since quittin.3     Smokeless tobacco: Never Used   Substance Use Topics     Alcohol use: Yes     Comment: occasionally     Family History   Problem Relation Age of Onset     Heart Disease Maternal Grandfather      Heart Disease Maternal Uncle          Current Outpatient Medications   Medication Sig Dispense Refill     EPINEPHrine (ANY BX GENERIC EQUIV) 0.3 MG/0.3ML injection 2-pack Inject 0.3 mLs (0.3 mg) into the muscle as needed for anaphylaxis 1 each 3     Allergies   Allergen Reactions     Bee Venom Shortness Of Breath and Swelling       Reviewed and updated as needed this visit by clinical staff                    Reviewed and updated as needed this visit by Provider                       Review of Systems   Constitutional: Negative for chills and fever.   HENT: Positive for sore throat. Negative for congestion, ear pain and hearing loss.    Eyes: Negative for pain.   Respiratory: Positive for shortness of breath. Negative for cough.    Cardiovascular:  "Positive for chest pain and palpitations. Negative for peripheral edema.   Gastrointestinal: Negative for abdominal pain, constipation, diarrhea, hematochezia and nausea.   Genitourinary: Negative for dysuria, frequency, genital sores, hematuria and urgency.   Musculoskeletal: Negative for joint swelling and myalgias.   Skin: Negative for rash.   Neurological: Positive for weakness and headaches. Negative for dizziness and paresthesias.   Psychiatric/Behavioral: Negative for mood changes. The patient is nervous/anxious.      CONSTITUTIONAL: NEGATIVE for fever, chills, change in weight  INTEGUMENTARY/SKIN: NEGATIVE for worrisome rashes, moles or lesions  EYES: NEGATIVE for vision changes or irritation  ENT: NEGATIVE for ear, mouth and throat problems  RESP: NEGATIVE for significant cough or SOB  CV: NEGATIVE for chest pain, palpitations or peripheral edema  GI: NEGATIVE for nausea, abdominal pain, heartburn, or change in bowel habits   male: negative for dysuria, hematuria, decreased urinary stream, erectile dysfunction, urethral discharge  MUSCULOSKELETAL: NEGATIVE for significant arthralgias or myalgia  NEURO: NEGATIVE for weakness, dizziness or paresthesias  PSYCHIATRIC: NEGATIVE for changes in mood or affect    OBJECTIVE:   BP (!) 136/90 (BP Location: Left arm, Patient Position: Sitting, Cuff Size: Adult Regular)   Pulse 73   Temp 97.9  F (36.6  C) (Tympanic)   Resp 18   Ht 1.72 m (5' 7.72\")   Wt 94.2 kg (207 lb 9.6 oz)   SpO2 98%   BMI 31.83 kg/m      Physical Exam  GENERAL: healthy, alert and no distress  NECK: no adenopathy, no asymmetry, masses, or scars and thyroid normal to palpation  RESP: lungs clear to auscultation - no rales, rhonchi or wheezes  CV: regular rate and rhythm, normal S1 S2, no S3 or S4, no murmur, click or rub, no peripheral edema and peripheral pulses strong  ABDOMEN: soft, nontender, no hepatosplenomegaly, no masses and bowel sounds normal  MS: no gross musculoskeletal defects " oriented to person, place, and time  No distress  HENT:   Head: Normocephalic and atraumatic  Eyes: Conjunctivae are normal    Neck: No JVD present  Cardiovascular: Normal rate and regular rhythm  No murmur heard  Pulmonary/Chest: Effort normal  No respiratory distress  He has no wheezes  He has no rales  Abdominal: Soft  He exhibits no distension  There is tenderness (mild suprapubic)  There is no guarding  Musculoskeletal: He exhibits deformity (L TMA)  He exhibits no edema  Neurological: He is alert and oriented to person, place, and time  Skin: Skin is warm and dry  Psychiatric: He has a normal mood and affect  Additional Data:     Labs:      Results from last 7 days  Lab Units 11/29/18  0426   WBC Thousand/uL 2 68*   HEMOGLOBIN g/dL 7 0*   HEMATOCRIT % 21 4*   PLATELETS Thousands/uL 129*   BANDS PCT % 1   LYMPHO PCT % 37   MONO PCT % 10   EOS PCT % 0       Results from last 7 days  Lab Units 11/29/18  0426   SODIUM mmol/L 136   POTASSIUM mmol/L 3 2*   CHLORIDE mmol/L 103   CO2 mmol/L 26   BUN mg/dL 11   CREATININE mg/dL 1 23   ANION GAP mmol/L 7   CALCIUM mg/dL 8 2*   ALBUMIN g/dL 2 0*   TOTAL BILIRUBIN mg/dL 0 30   ALK PHOS U/L 49   ALT U/L 7*   AST U/L 10   GLUCOSE RANDOM mg/dL 89                   Results from last 7 days  Lab Units 11/29/18  0426 11/28/18  0903   LACTIC ACID mmol/L  --  1 3   PROCALCITONIN ng/ml <0 05  --            * I Have Reviewed All Lab Data Listed Above  * Additional Pertinent Lab Tests Reviewed:  [facility] Admission Reviewed    Imaging:    Imaging Reports Reviewed Today Include: no new today      Recent Cultures (last 7 days):       Results from last 7 days  Lab Units 11/28/18  0926   URINE CULTURE  No Growth <1000 cfu/mL       Last 24 Hours Medication List:     Current Facility-Administered Medications:  acetaminophen 650 mg Oral Q6H PRN Ramiro Butts MD   divalproex sodium 500 mg Oral BID Ramiro Butts MD   enoxaparin 40 mg "noted, no edema    Diagnostic Test Results:  Labs reviewed in Epic    ASSESSMENT/PLAN:   (Z00.00) Routine general medical examination at a health care facility  (primary encounter diagnosis)  Comment:   Plan: as below.    (Z13.6) CARDIOVASCULAR SCREENING; LDL GOAL LESS THAN 160  Comment:   Plan: Comprehensive metabolic panel (BMP + Alb, Alk         Phos, ALT, AST, Total. Bili, TP), Lipid panel         reflex to direct LDL Fasting            (Z13.1) Screening for diabetes mellitus  Comment:   Plan: Comprehensive metabolic panel (BMP + Alb, Alk         Phos, ALT, AST, Total. Bili, TP), Hemoglobin         A1c            (R79.89) Elevated LFTs  Comment:   Plan: Comprehensive metabolic panel (BMP + Alb, Alk         Phos, ALT, AST, Total. Bili, TP), Hepatitis B         surface antigen            (Z23) Encounter for immunization  Comment:   Plan: COVID-19,PF,PFIZER (12+ Yrs GRAY LABEL)        Booster given.    (Z91.030) Bee sting allergy  Comment:   Plan: EPINEPHrine (ANY BX GENERIC EQUIV) 0.3 MG/0.3ML        injection 2-pack              Patient has been advised of split billing requirements and indicates understanding: Yes    COUNSELING:   Reviewed preventive health counseling, as reflected in patient instructions       Regular exercise       Healthy diet/nutrition       Vision screening    Estimated body mass index is 32.11 kg/m  as calculated from the following:    Height as of 3/31/21: 1.702 m (5' 7\").    Weight as of 3/31/21: 93 kg (205 lb).         He reports that he quit smoking about 4 years ago. His smoking use included cigarettes. He has never used smokeless tobacco.      Counseling Resources:  ATP IV Guidelines  Pooled Cohorts Equation Calculator  FRAX Risk Assessment  ICSI Preventive Guidelines  Dietary Guidelines for Americans, 2010  USDA's MyPlate  ASA Prophylaxis  Lung CA Screening    Pradip Mclean MD, MD  [facility]  " Subcutaneous Daily Judah Mistry MD   famotidine 20 mg Oral Daily Cathy Kimbrough MD   fluticasone 1 spray Each Nare BID Cathy Kimbrough MD   LORazepam 0 5 mg Oral Q6H PRN Judah Mistry MD   magnesium sulfate 2 g Intravenous Once Judah Mistry MD   ondansetron 4 mg Intravenous Q6H PRN Judah Mistry MD   oxyCODONE 5 mg Oral Q4H PRN Judah Mistry MD   pentoxifylline 400 mg Oral TID With Meals Judah Mistry MD   potassium chloride 20 mEq Oral TID With Meals Judah Mistry MD   tamsulosin 0 4 mg Oral Daily With Gladys Waters MD     Facility-Administered Medications Ordered in Other Encounters:  gemcitabine (GEMZAR) chemo infusion 2,000 mg Intravenous Once Charles Woo MD   ondansetron (ZOFRAN) IVPB (ONC use only) 8 mg Intravenous Once Charles Woo MD   sodium chloride 20 mL/hr Intravenous Continuous Charles Woo MD        Today, Patient Was Seen By: Rosalba Berry MD    ** Please Note: Dictation voice to text software may have been used in the creation of this document   **

## 2022-04-14 LAB — HBV SURFACE AG SERPL QL IA: NONREACTIVE

## 2022-04-22 ENCOUNTER — APPOINTMENT (OUTPATIENT)
Dept: INTERPRETER SERVICES | Facility: CLINIC | Age: 37
End: 2022-04-22
Payer: COMMERCIAL

## 2022-12-26 ENCOUNTER — HEALTH MAINTENANCE LETTER (OUTPATIENT)
Age: 37
End: 2022-12-26

## 2023-03-22 ENCOUNTER — APPOINTMENT (OUTPATIENT)
Dept: INTERPRETER SERVICES | Facility: CLINIC | Age: 38
End: 2023-03-22

## 2023-06-02 ENCOUNTER — HEALTH MAINTENANCE LETTER (OUTPATIENT)
Age: 38
End: 2023-06-02

## 2023-07-17 ENCOUNTER — OFFICE VISIT (OUTPATIENT)
Dept: URGENT CARE | Facility: URGENT CARE | Age: 38
End: 2023-07-17
Payer: COMMERCIAL

## 2023-07-17 VITALS
WEIGHT: 205.6 LBS | HEART RATE: 108 BPM | DIASTOLIC BLOOD PRESSURE: 102 MMHG | SYSTOLIC BLOOD PRESSURE: 156 MMHG | BODY MASS INDEX: 31.52 KG/M2 | OXYGEN SATURATION: 98 % | TEMPERATURE: 100.7 F

## 2023-07-17 DIAGNOSIS — L03.116 CELLULITIS OF LEFT LOWER EXTREMITY: Primary | ICD-10-CM

## 2023-07-17 PROCEDURE — 99213 OFFICE O/P EST LOW 20 MIN: CPT

## 2023-07-17 RX ORDER — CEPHALEXIN 500 MG/1
500 CAPSULE ORAL 3 TIMES DAILY
Qty: 21 CAPSULE | Refills: 0 | Status: SHIPPED | OUTPATIENT
Start: 2023-07-17 | End: 2023-07-24

## 2023-07-17 NOTE — PATIENT INSTRUCTIONS
Take the antibiotic as prescribed and finish the full course even if symptoms improve.  Try yogurt with active cultures or probiotics such as Culturelle daily to help prevent diarrhea while using antibiotics.  Go the emergency department with any increase in redness, swelling, drainage, bleeding, pain and/or worsening fever/chills.

## 2023-07-17 NOTE — PROGRESS NOTES
Assessment & Plan   (L03.116) Cellulitis of left lower extremity  (primary encounter diagnosis)  Plan: cephALEXin (KEFLEX) 500 MG capsule    Cellulitis patient instructions in Colombian discussed and provided.  Informed the patient to take the antibiotic as prescribed and finish the full course even if symptoms improve.  We discussed trying yogurt with active cultures or probiotic such as Culturelle daily to help prevent diarrhea while taking the antibiotic.  Informed the patient to go to the emergency department with any increase in redness, swelling, drainage, bleeding, pain and/or worsening fever/chills.  Patient acknowledged his understanding of the above plan.    The use of Dragon/MyMosa dictation services may have been used to construct the content in this note; any grammatical or spelling errors are non-intentional. Please contact the author of this note directly if you are in need of any clarification.      NAYELY Long CNP  [facility] URGENT CARE [place]    Silvia Meehan is a 37 year old male who presents to clinic today for the following health issues:  Chief Complaint   Patient presents with     Derm Problem     Possible blood clot. Lump on Lt leg, some blood / blistering, black spots. Noticed Friday, pain increasing since.      HPI  Patient reports redness and swelling behind his left leg that is gotten worse over the past 2 days.  He also reports the pain has increased since then and the area has been warm to touch.  He indicates that his work close rub on the back of his leg throughout the day and that he was in the [place] over the weekend swimming in the water waters.    ROS:  Negative except noted above.    Review of Systems        Objective    BP (!) 156/102 (BP Location: Right arm, Cuff Size: Adult Regular)   Pulse 108   Temp (!) 100.7  F (38.2  C) (Tympanic)   Wt 93.3 kg (205 lb 9.6 oz)   SpO2 98%   BMI 31.52 kg/m    Physical Exam   GENERAL: healthy, alert  and no distress  SKIN: Posterior left lower leg area of erythema and edema.  This area is also warmer to touch than the surrounding tissue.

## 2023-08-15 ENCOUNTER — APPOINTMENT (OUTPATIENT)
Dept: INTERPRETER SERVICES | Facility: CLINIC | Age: 38
End: 2023-08-15
Payer: COMMERCIAL

## 2023-09-22 ENCOUNTER — OFFICE VISIT (OUTPATIENT)
Dept: FAMILY MEDICINE | Facility: CLINIC | Age: 38
End: 2023-09-22
Payer: COMMERCIAL

## 2023-09-22 VITALS
HEART RATE: 61 BPM | DIASTOLIC BLOOD PRESSURE: 88 MMHG | RESPIRATION RATE: 12 BRPM | BODY MASS INDEX: 30.7 KG/M2 | OXYGEN SATURATION: 97 % | HEIGHT: 67 IN | WEIGHT: 195.6 LBS | TEMPERATURE: 97.8 F | SYSTOLIC BLOOD PRESSURE: 131 MMHG

## 2023-09-22 DIAGNOSIS — Z13.6 CARDIOVASCULAR SCREENING; LDL GOAL LESS THAN 160: ICD-10-CM

## 2023-09-22 DIAGNOSIS — Z13.1 SCREENING FOR DIABETES MELLITUS: ICD-10-CM

## 2023-09-22 DIAGNOSIS — R79.89 ELEVATED LFTS: ICD-10-CM

## 2023-09-22 DIAGNOSIS — Z00.00 ROUTINE GENERAL MEDICAL EXAMINATION AT A HEALTH CARE FACILITY: Primary | ICD-10-CM

## 2023-09-22 LAB
ALBUMIN SERPL BCG-MCNC: 4.7 G/DL (ref 3.5–5.2)
ALP SERPL-CCNC: 65 U/L (ref 40–129)
ALT SERPL W P-5'-P-CCNC: 35 U/L (ref 0–70)
ANION GAP SERPL CALCULATED.3IONS-SCNC: 12 MMOL/L (ref 7–15)
AST SERPL W P-5'-P-CCNC: 33 U/L (ref 0–45)
BILIRUB SERPL-MCNC: 0.6 MG/DL
BUN SERPL-MCNC: 12.1 MG/DL (ref 6–20)
CALCIUM SERPL-MCNC: 9.6 MG/DL (ref 8.6–10)
CHLORIDE SERPL-SCNC: 106 MMOL/L (ref 98–107)
CHOLEST SERPL-MCNC: 214 MG/DL
CREAT SERPL-MCNC: 0.9 MG/DL (ref 0.67–1.17)
DEPRECATED HCO3 PLAS-SCNC: 23 MMOL/L (ref 22–29)
EGFRCR SERPLBLD CKD-EPI 2021: >90 ML/MIN/1.73M2
GLUCOSE SERPL-MCNC: 93 MG/DL (ref 70–99)
HBA1C MFR BLD: 5.2 % (ref 0–5.6)
HDLC SERPL-MCNC: 44 MG/DL
LDLC SERPL CALC-MCNC: 126 MG/DL
NONHDLC SERPL-MCNC: 170 MG/DL
POTASSIUM SERPL-SCNC: 3.9 MMOL/L (ref 3.4–5.3)
PROT SERPL-MCNC: 7.5 G/DL (ref 6.4–8.3)
SODIUM SERPL-SCNC: 141 MMOL/L (ref 136–145)
TRIGL SERPL-MCNC: 220 MG/DL

## 2023-09-22 PROCEDURE — 80061 LIPID PANEL: CPT | Performed by: FAMILY MEDICINE

## 2023-09-22 PROCEDURE — 80053 COMPREHEN METABOLIC PANEL: CPT | Performed by: FAMILY MEDICINE

## 2023-09-22 PROCEDURE — 36415 COLL VENOUS BLD VENIPUNCTURE: CPT | Performed by: FAMILY MEDICINE

## 2023-09-22 PROCEDURE — 83036 HEMOGLOBIN GLYCOSYLATED A1C: CPT | Performed by: FAMILY MEDICINE

## 2023-09-22 PROCEDURE — 99395 PREV VISIT EST AGE 18-39: CPT | Performed by: FAMILY MEDICINE

## 2023-09-22 ASSESSMENT — ENCOUNTER SYMPTOMS
CONSTIPATION: 0
SHORTNESS OF BREATH: 0
ARTHRALGIAS: 0
NERVOUS/ANXIOUS: 0
CHILLS: 0
SORE THROAT: 0
NAUSEA: 0
DIZZINESS: 0
WEAKNESS: 0
MYALGIAS: 0
DIARRHEA: 0
EYE PAIN: 1
FEVER: 0
DYSURIA: 0
PALPITATIONS: 0
HEMATOCHEZIA: 1
COUGH: 0
ABDOMINAL PAIN: 0
PARESTHESIAS: 1
HEMATURIA: 0
HEADACHES: 0
FREQUENCY: 0
JOINT SWELLING: 0
HEARTBURN: 0

## 2023-09-22 ASSESSMENT — PAIN SCALES - GENERAL: PAINLEVEL: NO PAIN (0)

## 2023-09-22 NOTE — COMMUNITY RESOURCES LIST (ENGLISH)
09/22/2023   [facility]  N/A  For questions about this resource list or additional care needs, please contact your primary care clinic or care manager.  Phone: [phone]   Email: N/A   Address: [address] [place] [address]   Hours: N/A        Financial Stability       Rent and mortgage payment assistance  1  [facility] - Emergency Assistance Program (EAP) Distance: 2.35 miles      In-Person, Phone/Virtual   [address] [id number]  Language: American Sign Language, English  Hours: Mon - Fri 8:00 AM - 4:00 PM  Fees: Free   Phone: (360) 278-2002 Email: [email or web address]@[email or web address]. Website: https://www.[place]./residents#human-services     2  [employer] [facility] ([facility]) Distance: 3.04 miles      In-Person   [address] [place] [address]  Language: English  Hours: Mon - Fri 8:00 AM - 4:00 PM  Fees: Free   Phone: (418) 544-9767 Email: info@[facility] Website: https://[facility].[employer]/          Food and Nutrition       Food pantry  3  [facility] - Food Distribuition Distance: 1.5 miles      Pickup   [id number] [place] [address]  Language: English, Georgian  Hours: Tue 11:00 AM - 12:00 PM , Tue 12:00 PM - 3:30 PM Appt. Only, Thu 12:00 PM - 3:30 PM Appt. Only  Fees: Free   Phone: (109) 554-7963 Email: Janeen@[facility].org Website: http://[facility] HospitalK[facility]armaCo[facility].org/[facility]/     4  Community Emergency Assistance Programs (CEAP) - [facility] - Food Market Distance: 2.35 miles      Pickup   [address] [place] [address]  Language: English, Georgian  Hours: Mon - Tue 9:00 AM - 4:30 PM , Wed 9:00 AM - 7:00 PM , Thu 9:00 AM - 4:30 PM  Fees: Free   Phone: (341) 199-1990 Email: info@[employer] Website: http://www.[employer].org     SNAP application assistance  Edgar Verdin  [facility] Distance: 2.35 miles      In-Person, Phone/Virtual   [address] [place] [address]  Language: American Sign Language, English  Hours: Mon - Fri 8:00 AM - 4:00 PM  Fees: Free   Phone: (959) 737-1602 Email: [email or web address]. Website: https://www.[place]./residents#human-services     6  [facility] Distance: 2.82 miles      Phone/Virtual   [address] [place] [address]  Language: English, Palauan  Hours: Mon 9:00 AM - 1:00 PM , Tue - Thu 9:00 AM - 4:00 PM , Fri 9:00 AM - 1:00 PM  Fees: Free   Phone: (453) 681-1794 Email: info@[facility].[employer] Website: http://www.[facility].org/     Soup kitchen or free meals  7  [facility] [employer] Distance: 2.25 miles      Pickup   [address] [place] [address]  Language: English  Hours: Mon 12:00 PM - 1:00 PM , Wed 12:00 PM - 1:00 PM , Fri 12:00 PM - 1:00 PM  Fees: Free   Phone: (180) 339-9868 Ext.107 Email: admin@[facility] Website: http://[facility].org     8  [facility] - Family Table Meal Distance: 4.18 miles      In-Person, Pickup   [address] [place] [address]  Language: English  Hours: Thu 5:30 PM - 6:30 PM  Fees: Free   Phone: (675) 384-7654 Email: office@[employer].org Website: http://www.[employer].org          Important Numbers & Websites       Emergency Services   911  City Services   311  Poison Control   (899) 557-8117  Suicide Prevention Lifeline   (475) 751-2968 (TALK)  Child Abuse Hotline   (170) 745-9160 (4-A-Child)  Sexual Assault Hotline   (774) 889-2965 (HOPE)  National Runaway Safeline   (251) 851-2627 (RUNAWAY)  All-Options Talkline   (717) 741-8588  Substance Abuse Referral   (966) 736-3913 (HELP)

## 2023-09-22 NOTE — COMMUNITY RESOURCES LIST (PATIENT PREFERRED LANGUAGE)
09/22/2023   [facility]  N/A  Si tiene preguntas sobre esta lista de recursos o necesidades de atención adicionales, comuníquese con prado clínica de atención primaria o administrador de atención.  Phone: [phone]   Email: N/A   Address: [address] [place] [address]   Hours: N/A        Estabilidad financiera       Asistencia para el pago de alquiler e hipoteca  1  [place] - [facility]ro de servicios humanos [facility] del [facility] - Programa de Asistencia de Emergencia (EAP) Distancia: 2.35 millas      En persona, Teléfono/Virtual   [address] [place] [address]  Idioma: armen Medina de signos americano  Horas: inderjit walker 8:00 - 16:00  Honorarios: Gavi   Phone: [phone] Email: [email or web address] Website: [email or web address]     2  Asociación de Acción Comunitaria del [place] (CAP-) Distancia: 3.04 millas      En persona   [address] [place] [address]  Idioma: Carol  Horas: inderjit walker 8:00 - 16:00  Honorarios: Gavi   Phone: [phone] Email: [email or web address] Website: [email or web address]          Comida y nutrición       Despensa de alimentos  3  Ejército de [place] - [place] - [place] - Distribución de alimentos Distancia: 1.5 millas      Levantar   [address] [place] [address]  Idioma: Carol Madrigal  Horas: heriberto 11:00 - 12:00 , heriberto 12:00 - 15:30 Augusta Solamente, jueves 12:00 - 15:30 Augusta Solamente  Honorarios: Gavi   Phone: [phone] Email: [email or web address] Website: [email or web address]://[facility].org/[facility]/noris/     4  Programas comunitarios de asistencia de emergencia (CEAP) - [place] del condado de [place] - Comida de shepherd Distancia: 2.35 millas      Levperezr   [address] [place] [address]  Idioma: Carol Madrigal: inderjit louis 9:00 - 16:30 ,  miércoles 9:00 - 19:00 , jueves 9:00 - 16:30  Honorarios: Gavi   Phone: [phone] Email: info@[employer] Website: http://www.[employer]     Asistencia para la solicitud de SNAP  5  [facility] de [facility] de servicios humanos [facility] del [facility] Distancia: 2.35 millas      En persona, Teléfono/Virtual   [address] [place] [address]  Idioma: armen Medina de signos americano  Horas: lunes - viernes 8:00 - 16:00  Honorarios: Gavi   Phone: [phone] Email: [email or web address] Website: [email or web address]/residents#[email or web address]-services     6  [facility] Distancia: 2.82 millas      Teléfono/Virtual   [address] [place] [address]  Idioma: Carol Madrigal  Horas: lunes 9:00 - 13:00 , heriberto - jueves 9:00 - 16:00 , viernes 9:00 - 13:00  Honorarios: Gavi   Phone: [phone] Email: [email or web address].[email or web address] Website: http://www.[address] [place] [address].org/     Comedor social o comidas gavi  7  Jamey Metodista Unida de [place] - sebastian y duglases Distancia: 2.25 millas      Levantar   [address] [place] [address]  Idioma: Inglés  Horas: lunes 12:00 - 13:00 , miércoles 12:00 - 13:00 , viernes 12:00 - 13:00  Honorarios: Gavi   Phone: [phone] Email: [email or web address] Website: [email or web address]://[email or web address].org     8  Jamey Luterana de [place] - Comida de camejo familiar Distancia: 4.18 millas      En persona, Levantar   [address] [place] [address]  Idioma: Carol Monacoas: baldemar 17:30 - 18:30  Honorarios: Gavi   Phone: [phone] Email: [email or web address] Website: http://www.[facility].SPOOTNIC.COM          Números y sitios web importantes       Servicios de emergencia   911  Servicios de la ciudad   311  Control de envenenamiento   [phone]  Línea vital de prevención del suicidio   [phone] (TALK)  Línea directa contra el abuso de niños   [phone] (4-A-Child)  Línea directa contra el  abuso sexual   [phone] (HOPE)  Línea directa nacional de emergencia para fugitivos   [phone] (RUNAWAY)  Línea de ayuda para la sowmya embarazada   [phone]  Derivación para el tratamiento por abuso de sustancias   [phone] (HELP)

## 2023-09-22 NOTE — PROGRESS NOTES
SUBJECTIVE:   CC: Zoran is an 38 year old who presents for preventative health visit.       2023     4:19 PM   Additional Questions   Roomed by Bobbi       Healthy Habits:     Getting at least 3 servings of Calcium per day:  Yes    Bi-annual eye exam:  NO    Dental care twice a year:  NO    Sleep apnea or symptoms of sleep apnea:  Sleep apnea    Diet:  Regular (no restrictions)    Frequency of exercise:  1 day/week    Duration of exercise:  15-30 minutes    Taking medications regularly:  Yes    Medication side effects:  Not applicable      Today's PHQ-2 Score:       2023     4:15 PM   PHQ-2 (  [employer])   Q1: Little interest or pleasure in doing things 0   Q2: Feeling down, depressed or hopeless 0   PHQ-2 Score 0   Q1: Little interest or pleasure in doing things Not at all   Q2: Feeling down, depressed or hopeless Not at all   PHQ-2 Score 0           Social History     Tobacco Use    Smoking status: Former     Types: Cigarettes     Quit date: 2017     Years since quittin.8    Smokeless tobacco: Never   Substance Use Topics    Alcohol use: Yes     Comment: occasionally             2023     4:15 PM   Alcohol Use   Prescreen: >3 drinks/day or >7 drinks/week? Yes   AUDIT SCORE  5         2023     4:15 PM   AUDIT - Alcohol Use Disorders Identification Test - Reproduced from the World Health Organization Audit 2001 (Second Edition)   1.  How often do you have a drink containing alcohol? 2 to 4 times a month   2.  How many drinks containing alcohol do you have on a typical day when you are drinking? 1 or 2   3.  How often do you have five or more drinks on one occasion? Weekly   4.  How often during the last year have you found that you were not able to stop drinking once you had started? Never   5.  How often during the last year have you failed to do what was normally expected of you because of drinking? Never   6.  How often during the last year have you needed a first drink in the  morning to get yourself going after a heavy drinking session? Never   7.  How often during the last year have you had a feeling of guilt or remorse after drinking? Never   8.  How often during the last year have you been unable to remember what happened the night before because of your drinking? Never   9.  Have you or someone else been injured because of your drinking? No   10. Has a relative, friend, doctor or other health care worker been concerned about your drinking or suggested you cut down? No   TOTAL SCORE 5       Last PSA: No results found for: PSA    Reviewed orders with patient. Reviewed health maintenance and updated orders accordingly - Yes  Lab work is in process  Labs reviewed in EPIC  BP Readings from Last 3 Encounters:   23 131/88   23 (!) 156/102   22 (!) 136/90    Wt Readings from Last 3 Encounters:   23 88.7 kg (195 lb 9.6 oz)   23 93.3 kg (205 lb 9.6 oz)   22 94.2 kg (207 lb 9.6 oz)                  Patient Active Problem List   Diagnosis    Pain in joint, lower leg    CARDIOVASCULAR SCREENING; LDL GOAL LESS THAN 160    Obesity (BMI 30.0-34.9)    Alcohol consumption binge drinking    Congenital abnormality of limb    BENJI (obstructive sleep apnea)    Deviated nasal septum    Nasal obstruction    Adenoid hypertrophy     Past Surgical History:   Procedure Laterality Date    SEPTOPLASTY, TURBINOPLASTY, COMBINED N/A 3/27/2018    Procedure: COMBINED SEPTOPLASTY, TURBINOPLASTY;;  Surgeon: Jesse Bashir MD;  Location:  OR    TONSILLECTOMY, ADENOIDECTOMY, COMBINED Bilateral 3/27/2018    Procedure: COMBINED TONSILLECTOMY, ADENOIDECTOMY;  Bilateral tonsillectomy, adenoidectomy, Uvulectomy, Endoscopic septoplasty with turbinate reduction;  Surgeon: Jesse Bashir MD;  Location:  OR       Social History     Tobacco Use    Smoking status: Former     Types: Cigarettes     Quit date: 2017     Years since quittin.8    Smokeless tobacco: Never    Substance Use Topics    Alcohol use: Yes     Comment: occasionally     Family History   Problem Relation Age of Onset    Heart Disease Maternal Grandfather     Heart Disease Maternal Uncle          Current Outpatient Medications   Medication Sig Dispense Refill    EPINEPHrine (ANY BX GENERIC EQUIV) 0.3 MG/0.3ML injection 2-pack Inject 0.3 mLs (0.3 mg) into the muscle as needed for anaphylaxis 1 each 3     Allergies   Allergen Reactions    Bee Venom Shortness Of Breath and Swelling     Recent Labs   Lab Test 04/13/22  1142 03/31/21  1026 02/14/19  1212   A1C 5.3  --   --    * Cannot estimate LDL when triglyceride exceeds 400 mg/dL  153* 91   HDL 35* 49 55   TRIG 462* 437* 100   ALT 80* 85* 42   CR 0.78 0.76 0.96   GFRESTIMATED >90 >90 >90   GFRESTBLACK  --  >90 >90   POTASSIUM 3.8 4.0 4.1        Reviewed and updated as needed this visit by clinical staff   Tobacco  Allergies  Meds              Reviewed and updated as needed this visit by Provider                     Review of Systems   Constitutional:  Negative for chills and fever.   HENT:  Negative for congestion, ear pain, hearing loss and sore throat.    Eyes:  Positive for pain. Negative for visual disturbance.   Respiratory:  Negative for cough and shortness of breath.    Cardiovascular:  Positive for chest pain. Negative for palpitations and peripheral edema.   Gastrointestinal:  Positive for hematochezia. Negative for abdominal pain, constipation, diarrhea, heartburn and nausea.   Genitourinary:  Negative for dysuria, frequency, genital sores, hematuria, penile discharge and urgency.   Musculoskeletal:  Negative for arthralgias, joint swelling and myalgias.   Skin:  Negative for rash.   Neurological:  Positive for paresthesias. Negative for dizziness, weakness and headaches.   Psychiatric/Behavioral:  Negative for mood changes. The patient is not nervous/anxious.      CONSTITUTIONAL: NEGATIVE for fever, chills, change in  "weight  INTEGUMENTARY/SKIN: NEGATIVE for worrisome rashes, moles or lesions  EYES: NEGATIVE for vision changes or irritation  ENT: NEGATIVE for ear, mouth and throat problems  RESP: NEGATIVE for significant cough or SOB  CV: NEGATIVE for chest pain, palpitations or peripheral edema  GI: NEGATIVE for nausea, abdominal pain, heartburn, or change in bowel habits   male: negative for dysuria, hematuria, decreased urinary stream, erectile dysfunction, urethral discharge  MUSCULOSKELETAL: NEGATIVE for significant arthralgias or myalgia  NEURO: NEGATIVE for weakness, dizziness or paresthesias  PSYCHIATRIC: NEGATIVE for changes in mood or affect    OBJECTIVE:   /88 (BP Location: Left arm, Patient Position: Sitting, Cuff Size: Adult Large)   Pulse 61   Temp 97.8  F (36.6  C) (Oral)   Resp 12   Ht 1.71 m (5' 7.32\")   Wt 88.7 kg (195 lb 9.6 oz)   SpO2 97%   BMI 30.34 kg/m      Physical Exam  GENERAL: healthy, alert and no distress  NECK: no adenopathy, no asymmetry, masses, or scars and thyroid normal to palpation  RESP: lungs clear to auscultation - no rales, rhonchi or wheezes  CV: regular rate and rhythm, normal S1 S2, no S3 or S4, no murmur, click or rub, no peripheral edema and peripheral pulses strong  ABDOMEN: soft, nontender, no hepatosplenomegaly, no masses and bowel sounds normal  MS: no gross musculoskeletal defects noted, no edema    Diagnostic Test Results:  Labs reviewed in Epic    ASSESSMENT/PLAN:   (Z00.00) Routine general medical examination at a health care facility  (primary encounter diagnosis)  Comment:   Plan: as below.    (Z13.6) CARDIOVASCULAR SCREENING; LDL GOAL LESS THAN 160  Comment:   Plan: Comprehensive metabolic panel (BMP + Alb, Alk         Phos, ALT, AST, Total. Bili, TP), Lipid panel         reflex to direct LDL Fasting            (Z13.1) Screening for diabetes mellitus  Comment:   Plan: Comprehensive metabolic panel (BMP + Alb, Alk         Phos, ALT, AST, Total. Bili, TP), " "Hemoglobin         A1c            (R79.89) Elevated LFTs  Comment:   Plan: Comprehensive metabolic panel (BMP + Alb, Alk         Phos, ALT, AST, Total. Bili, TP)        Patient doing well with weight loss. Monitor.    Patient has been advised of split billing requirements and indicates understanding: Yes      COUNSELING:   Reviewed preventive health counseling, as reflected in patient instructions       Regular exercise       Healthy diet/nutrition       Vision screening      BMI:   Estimated body mass index is 30.34 kg/m  as calculated from the following:    Height as of this encounter: 1.71 m (5' 7.32\").    Weight as of this encounter: 88.7 kg (195 lb 9.6 oz).         He reports that he quit smoking about 5 years ago. His smoking use included cigarettes. He has never used smokeless tobacco.            Pradip Mclean MD, MD  [facility]  "

## 2023-09-22 NOTE — COMMUNITY RESOURCES LIST (PATIENT PREFERRED LANGUAGE)
09/22/2023   [facility]  N/A  Si tiene preguntas sobre esta lista de recursos o necesidades de atención adicionales, comuníquese con prado clínica de atención primaria o administrador de atención.  Phone: [phone]   Email: N/A   Address: [address] [place] [address]   Hours: N/A        Estabilidad financiera       Asistencia para el pago de alquiler e hipoteca  1  [place] - [facility]ro [facility] humanos [facility] del [facility] - Programa de Asistencia de Emergencia (EAP) Distancia: 2.35 millas      En persona, Teléfono/Virtual   [address] [place] [address]  Idioma: armen Medina de signos americano  Horas: inderjit walker 8:00 - 16:00  Honorarios: Gavi   Phone: [phone] Email: [email or web address] Website: [email or web address]-services     2  Asociación de Acción Comunitaria del [place] (CAP-) Distancia: 3.04 millas      En persona   [address] [place] [address]  Idioma: Carol  Horas: inderjit walker 8:00 - 16:00  Honorarios: Gavi   Phone: [phone] Email: info@[email or web address].org Website: [email or web address]://[employer].org/          Comida y nutrición       Despensa de alimentos  3  Ejército de [place] - [place] - Distribución de alimentos Distancia: 1.5 millas      Levantar   [address] [place] [address]  Idioma: Carol Madrigal  Horas: heriberto 11:00 - 12:00 , heriberto 12:00 - 15:30 Augusta Solamente, jueves 12:00 - 15:30 Augusta Solamente  Honorarios: Gavi   Phone: [phone] Email: [email or web address] Website: [email or web address].org/[facility]/noris/     4  Programas comunitarios de asistencia de emergencia (CEAP) - [place] del condado de [place] - Comida de shepherd Distancia: 2.35 millas      Levperezr   [address] [place] [address]  Idioma: Carol Madrigal: inderjit louis 9:00 - 16:30 ,  miércoles 9:00 - 19:00 , jueves 9:00 - 16:30  Honorarios: Gavi   Phone: [phone] Email: info@[email or web address] Website: [email or web address]://www.[email or web address]     Asistencia para la solicitud de SNAP  5  Condado de [facility] de servicios humanos [facility] del [facility] Distancia: 2.35 millas      En persona, Teléfono/Virtual   [address] [place] [address]  Idioma: armen Medina de signos americano  Horas: lunes - viernes 8:00 - 16:00  Honorarios: Gavi   Phone: [phone] Email: [email or web address] Website: [email or web address].[email or web address]/residents#[email or web address]-services     6  [facility] Distancia: 2.82 millas      Teléfono/Virtual   [address] [place] [address]  Idioma: Carol Madrigal  Horas: lunes 9:00 - 13:00 , heriberto - jueves 9:00 - 16:00 , viernes 9:00 - 13:00  Honorarios: Gavi   Phone: [phone] Email: info@[email or web address] Center[facility].[employer] Website: http://www.[address] [place] [address].org/     Comedor social o comidas gavi  7  Jamey Metodista Unida de Kate - sebastian y duglases Distancia: 2.25 millas      Levantar   [address] [place] [address]  Idioma: Inglés  Horas: lunes 12:00 - 13:00 , miércoles 12:00 - 13:00 , viernes 12:00 - 13:00  Honorarios: Gavi   Phone: [phone] Email: [email or web address] Website: http://[facility].org     8  Jamey Luterana de [place] - Comida de camejo familiar Distancia: 4.18 millas      En persona, Levantar   [address] [place] [address]  Idioma: Carol Monacoas: baldemar 17:30 - 18:30  Honorarios: Gavi   Phone: [phone] Email: office@"[employer] Website: http://www.[facility].[employer]          Números y sitios web importantes       Servicios de emergencia   911  Servicios de la ciudad   311  Control de envenenamiento   [phone]  Línea vital de prevención del suicidio   [phone] (TALK)  Línea directa contra el abuso de niños   [phone] (4-A-Child)  Línea directa contra el  abuso sexual   [phone] (HOPE)  Línea directa nacional de emergencia para fugitivos   [phone] (RUNAWAY)  Línea de ayuda para la sowmya embarazada   [phone]  Derivación para el tratamiento por abuso de sustancias   [phone] (HELP)

## 2024-01-16 ENCOUNTER — APPOINTMENT (OUTPATIENT)
Dept: INTERPRETER SERVICES | Facility: CLINIC | Age: 39
End: 2024-01-16
Payer: COMMERCIAL

## 2024-08-20 ENCOUNTER — APPOINTMENT (OUTPATIENT)
Dept: INTERPRETER SERVICES | Facility: CLINIC | Age: 39
End: 2024-08-20
Payer: COMMERCIAL

## 2024-09-26 ENCOUNTER — OFFICE VISIT (OUTPATIENT)
Dept: FAMILY MEDICINE | Facility: CLINIC | Age: 39
End: 2024-09-26
Payer: COMMERCIAL

## 2024-09-26 VITALS
SYSTOLIC BLOOD PRESSURE: 128 MMHG | HEART RATE: 79 BPM | HEIGHT: 67 IN | WEIGHT: 207 LBS | OXYGEN SATURATION: 98 % | TEMPERATURE: 98 F | BODY MASS INDEX: 32.49 KG/M2 | RESPIRATION RATE: 12 BRPM | DIASTOLIC BLOOD PRESSURE: 87 MMHG

## 2024-09-26 DIAGNOSIS — G47.33 OSA (OBSTRUCTIVE SLEEP APNEA): ICD-10-CM

## 2024-09-26 DIAGNOSIS — J34.89 NASAL OBSTRUCTION: ICD-10-CM

## 2024-09-26 DIAGNOSIS — Z13.220 LIPID SCREENING: ICD-10-CM

## 2024-09-26 DIAGNOSIS — Z00.00 ROUTINE GENERAL MEDICAL EXAMINATION AT A HEALTH CARE FACILITY: Primary | ICD-10-CM

## 2024-09-26 DIAGNOSIS — L03.116 CELLULITIS OF LEFT LOWER EXTREMITY: ICD-10-CM

## 2024-09-26 DIAGNOSIS — N43.3 HYDROCELE IN ADULT: ICD-10-CM

## 2024-09-26 DIAGNOSIS — Z13.1 SCREENING FOR DIABETES MELLITUS: ICD-10-CM

## 2024-09-26 PROCEDURE — 90715 TDAP VACCINE 7 YRS/> IM: CPT

## 2024-09-26 PROCEDURE — 99395 PREV VISIT EST AGE 18-39: CPT | Mod: 57

## 2024-09-26 PROCEDURE — 99214 OFFICE O/P EST MOD 30 MIN: CPT | Mod: 25

## 2024-09-26 PROCEDURE — 90471 IMMUNIZATION ADMIN: CPT

## 2024-09-26 RX ORDER — FLUTICASONE PROPIONATE 50 MCG
1 SPRAY, SUSPENSION (ML) NASAL DAILY
Qty: 15.8 ML | Refills: 0 | Status: SHIPPED | OUTPATIENT
Start: 2024-09-26

## 2024-09-26 RX ORDER — CEPHALEXIN 500 MG/1
500 CAPSULE ORAL 3 TIMES DAILY
Qty: 21 CAPSULE | Refills: 0 | Status: SHIPPED | OUTPATIENT
Start: 2024-09-26 | End: 2024-10-03

## 2024-09-26 ASSESSMENT — PAIN SCALES - GENERAL: PAINLEVEL: NO PAIN (0)

## 2024-09-26 NOTE — PATIENT INSTRUCTIONS
"Patient Education   Consejos de atención preventiva  Se trata de consejos generales que solemos ofrecer para ayudar a las personas a mantenerse saludables. Prado equipo de atención puede darle consejos específicos. Hable con ellos sobre asya propias necesidades de atención preventiva.  Estilo de ryley  Ejercítese, xavier mínimo, 150 minutos a la semana (30 minutos al día, 5 días a la semana).  Realice actividades de fortalecimiento muscular 2 días a la semana, ya que contribuyen al control del peso y a la prevención de enfermedades.  No fume.  Use protector solar para prevenir el cáncer de piel.  Cada 2 a 5 años, realice pruebas de detección de radón en prado hogar. Se trata de un gas incoloro e inodoro que puede dañar los pulmones. Para obtener más información, visite www.health.[facility].mn.us y busque \"Radon in Homes\" (Radón en los hogares).  Mantenga las aubree descargadas y bajo llave en un lugar seguro, xavier paddy caja kecia o paddy cámara blindada, o use un candado para aubree y esconda las llaves. Guarde siempre las balas por separado. Para obtener más información, visite [employer] y busque \"Safe Gun Storage\" (Almacenamiento seguro kiya).  Alimentación  Consuma 5 o más porciones de frutas y verduras al día.  Pruebe el pan de madelin, el arroz integral y la pasta integral (en lugar de pan salinas, arroz salinas y pasta).  Consuma suficiente calcio y vitamina D. Revise la etiqueta de los alimentos y procure alcanzar el 100 % de la ingesta diaria recomendada (IDR).  Exámenes periódicos  Hágase un examen dental y paddy limpieza cada 6 meses.  Visite a prado equipo de atención médica todos los años para hablar sobre lo siguiente:  Todo cambio en prado pasquale.  Todo medicamento que prado equipo de atención le haya recetado.  Atención preventiva, planificación familiar y formas de prevenir enfermedades crónicas.  Inyecciones (vacunas)   Vacunas contra el virus del papiloma humano (VPH) (hasta los 26 años), si nunca se las gates colocado.  Vacunas " contra la hepatitis B (hasta los 59 años), si nunca se las gates colocado.  Vacuna contra la COVID-19: vacúnese cuando corresponda.  Vacuna contra la gripe: vacúnese contra la gripe todos los años.  Vacuna contra el tétanos: vacúnese contra el tétanos cada 10 años.  Vacunas contra el neumococo, la hepatitis A y el virus sincicial respiratorio (VSR): pregunte a prado equipo de atención si las necesita en función de prado riesgo.  Vacuna contra el herpes zóster (para personas de 50 años o más).  Pruebas médicas generales  Examen de detección de diabetes:  A partir de los 35 años, hágase exámenes de detección de diabetes, xavier mínimo, cada 3 años.  Si tiene menos de 35 años, pregunte a prado equipo de atención si debe hacerlo.  Prueba de colesterol: a los 39 años, comience a hacerse paddy prueba de colesterol cada 5 años o con mayor frecuencia si se lo aconsejan.  Exploración de densidad ósea (DEXA): a los 50 años, pregunte a prado equipo de atención si debe hacerse esta exploración para detectar osteoporosis (fragilidad en los huesos).  Hepatitis C: hágase la prueba, xavier mínimo, paddy vez en la ryley.  Examen de detección de aneurismas aórticos abdominales: hable con prado médico sobre la posibilidad de hacerse meeta examen de detección si cumple alguna de las siguientes condiciones:  fumó alguna vez;  y  es hombre según prado sexo biológico;  y  tiene entre 65 y 75 años.  Infecciones de transmisión sexual (ITS)  Antes de los 24 años, pregunte a prado equipo de atención si debe hacerse exámenes de detección de ITS.  Después de los 24 años, hágase exámenes de detección de ITS si está en riesgo. Está en riesgo de contraer alguna ITS (incluido el virus de la inmunodeficiencia adquirida [VIH]) en los siguientes casos:  Tiene relaciones sexuales con más de paddy persona.  No usa preservativos siempre que tiene relaciones sexuales.  A usted o a prado luis m le diagnosticaron paddy infección de transmisión sexual.  Si está en riesgo de contraer el VIH,  consulte sobre los medicamentos de la profilaxis de preexposición (Pre-Exposure Prophylaxis, PrEP) para prevenirlo.  Hágase la prueba del VIH, xavier mínimo, paddy vez en la ryley, tanto si está en riesgo de contraer el virus xavier si no.  Pruebas de detección de cáncer  Examen de detección de cáncer de gail uterino: si tiene gail uterino, comience a hacerse pruebas periódicas de detección de cáncer de gail uterino a los 21 años. La mayoría de las personas que se hacen exámenes periódicos de detección y obtienen resultados normales pueden dejar de hacerlo a partir de los 65 años. Hable sobre esto con prado proveedor.  Exploración de detección de cáncer de mama (mamografía): si alguna vez ha tenido mamas, comience a hacerse mamografías periódicas a partir de los 40 años. Se trata de paddy exploración para detectar el cáncer de mama.  Examen de detección de cáncer de colon: es importante comenzar a hacerse los exámenes de detección de cáncer de colon a los 45 años.  Hágase paddy colonoscopía cada 10 años (o con más frecuencia si está en riesgo) O pregunte a prado proveedor sobre pruebas de heces, xavier la prueba inmunoquímica fecal (Fecal Immunochemical Test, FIT) cada año o la prueba Cologuard cada 3 años.  Para obtener más información sobre las opciones de las pruebas que tiene a disposición, visite: www.fvfiles.com/291372vm.  Si necesita ayuda para starr paddy decisión, visite: [email or web address].  Prueba de detección de cáncer de próstata: si tiene próstata y está entre los 55 y 69 años, pregunte a prado proveedor si le convendría hacerse paddy prueba anual de detección de cáncer de próstata.  Examen de detección de cáncer de pulmón: si fuma o fumó y tiene entre 50 y 80 años, pregunte a prado equipo de atención si los exámenes continuos de detección de cáncer de pulmón son adecuados para usted.    Solo para uso con fines informativos. Aline documento no pretende sustituir ninguna recomendación médica. Derechos de autor   2023 [facility].   Todos los derechos [facility]. Revisión clínica a cargo de  [facility]. [employer] [id number] - REV 04/24.  Learning About Stress  What is stress?     Stress is your body's response to a hard situation. Your body can have a physical, emotional, or mental response. Stress is a fact of life for most people, and it affects everyone differently. What causes stress for you may not be stressful for someone else.  A lot of things can cause stress. You may feel stress when you go on a job interview, take a test, or run a race. This kind of short-term stress is normal and even useful. It can help you if you need to work hard or react quickly. For example, stress can help you finish an important job on time.  Long-term stress is caused by ongoing stressful situations or events. Examples of long-term stress include long-term health problems, ongoing problems at work, or conflicts in your family. Long-term stress can harm your health.  How does stress affect your health?  When you are stressed, your body responds as though you are in danger. It makes hormones that speed up your heart, make you breathe faster, and give you a burst of energy. This is called the fight-or-flight stress response. If the stress is over quickly, your body goes back to normal and no harm is done.  But if stress happens too often or lasts too long, it can have bad effects. Long-term stress can make you more likely to get sick, and it can make symptoms of some diseases worse. If you tense up when you are stressed, you may develop neck, shoulder, or low back pain. Stress is linked to high blood pressure and heart disease.  Stress also harms your emotional health. It can make you matt, tense, or depressed. Your relationships may suffer, and you may not do well at work or school.  What can you do to manage stress?  You can try these things to help manage stress:   Do something active. Exercise or activity can help  reduce stress. Walking is a great way to get started. Even everyday activities such as housecleaning or yard work can help.  Try yoga or leslie chi. These techniques combine exercise and meditation. You may need some training at first to learn them.  Do something you enjoy. For example, listen to music or go to a movie. Practice your hobby or do volunteer work.  Meditate. This can help you relax, because you are not worrying about what happened before or what may happen in the future.  Do guided imagery. Imagine yourself in any setting that helps you feel calm. You can use online videos, books, or a teacher to guide you.  Do breathing exercises. For example:  From a standing position, bend forward from the waist with your knees slightly bent. Let your arms dangle close to the floor.  Breathe in slowly and deeply as you return to a standing position. Roll up slowly and lift your head last.  Hold your breath for just a few seconds in the standing position.  Breathe out slowly and bend forward from the waist.  Let your feelings out. Talk, laugh, cry, and express anger when you need to. Talking with supportive friends or family, a counselor, or a marissa leader about your feelings is a healthy way to relieve stress. Avoid discussing your feelings with people who make you feel worse.  Write. It may help to write about things that are bothering you. This helps you find out how much stress you feel and what is causing it. When you know this, you can find better ways to cope.  What can you do to prevent stress?  You might try some of these things to help prevent stress:  Manage your time. This helps you find time to do the things you want and need to do.  Get enough sleep. Your body recovers from the stresses of the day while you are sleeping.  Get support. Your family, friends, and community can make a difference in how you experience stress.  Limit your news feed. Avoid or limit time on social media or news that may make you  "feel stressed.  Do something active. Exercise or activity can help reduce stress. Walking is a great way to get started.  Where can you learn more?  Go to https://www.[employer].net/patiented  Enter N032 in the search box to learn more about \"Learning About Stress.\"  Current as of: October 24, 2023               Content Version: 14.0    2324-7699 [employer].   Care instructions adapted under license by your healthcare professional. If you have questions about a medical condition or this instruction, always ask your healthcare professional. [employer] disclaims any warranty or liability for your use of this information.         "

## 2024-09-26 NOTE — PROGRESS NOTES
"Preventive Care Visit  [facility]  NAYELY Bryan CNP, Family Medicine  Sep 26, 2024    Assessment & Plan     Routine general medical examination at a health care facility  - Health maintenance and lifestyle reviewed. Updated medical and family history.  - Follow up in one year or sooner as needed.   - PRIMARY CARE FOLLOW-UP SCHEDULING  - Comprehensive metabolic panel (BMP + Alb, Alk Phos, ALT, AST, Total. Bili, TP)  - TDAP 7+ (ADACEL,BOOSTRIX)    BENJI (obstructive sleep apnea)  - Noted. Patient notes significant improvement since adenoidectomy and turbinate resection. Currently sleeping well.     Nasal obstruction  - Gradually increasing feeling of obstruction since surgery in 2019. Restart nasal sprays. Return to ENT if symptoms continue to worsen.   - fluticasone (FLONASE) 50 MCG/ACT nasal spray  Dispense: 15.8 mL; Refill: 0    Cellulitis of left lower extremity  - One week history of open scab on posterior left thigh with surrounding redness and pain. Similar presentation to cellulitis patient was treated for in July 2023.   - cephALEXin (KEFLEX) 500 MG capsule  Dispense: 21 capsule; Refill: 0    Hydrocele in adult  - Gradually increasing scrotal discomfort, mild enlargement. US to evaluate for hydrocele. Counseling provided.  - US Testicular & Scrotum w Doppler Ltd    Screening for diabetes mellitus  - Hemoglobin A1c    Lipid screening  - Lipid panel reflex to direct LDL Fasting    BMI  Estimated body mass index is 32 kg/m  as calculated from the following:    Height as of this encounter: 1.713 m (5' 7.44\").    Weight as of this encounter: 93.9 kg (207 lb).   Weight management plan: Discussed healthy diet and exercise guidelines    Counseling  Appropriate preventive services were addressed with this patient via screening, questionnaire, or discussion as appropriate for fall prevention, nutrition, physical activity, Tobacco-use cessation, social engagement, weight loss and " cognition.  Checklist reviewing preventive services available has been given to the patient.  Reviewed patient's diet, addressing concerns and/or questions.   The patient was instructed to see the dentist every 6 months.     Due for annual physical in one year, otherwise follow up on as-needed basis.    Silvia Meehan is a 39 year old, presenting for the following:  Physical        9/26/2024     2:53 PM   Additional Questions   Roomed by Bobbi        Health Care Directive  Patient does not have a Health Care Directive or Living Will: Discussed advance care planning with patient; information given to patient to review.    HPI  Constant discomfort bilateral testicles, present for about one month. No injury, used to work construction and lifted heavy weights. No pain when squeezing, kind of feels like there's fluid in there. Hurts more when he lays on his stomach.     Lesion posterior left calf with increasing pain and redness over 2 weeks. No drainage.        9/26/2024   General Health   How would you rate your overall physical health? Good   Feel stress (tense, anxious, or unable to sleep) Rather much      (!) STRESS CONCERN      9/26/2024   Nutrition   Three or more servings of calcium each day? (!) I DON'T KNOW   Diet: Regular (no restrictions)   How many servings of fruit and vegetables per day? (!) 0-1   How many sweetened beverages each day? 0-1          9/26/2024   Exercise   Days per week of moderate/strenous exercise 5 days          9/26/2024   Social Factors   Frequency of gathering with friends or relatives Once a week   Worry food won't last until get money to buy more No   Food not last or not have enough money for food? No   Do you have housing? (Housing is defined as stable permanent housing and does not include staying ouside in a car, in a tent, in an abandoned building, in an overnight shelter, or couch-surfing.) Yes   Are you worried about losing your housing? No   Lack of transportation? No  "  Unable to get utilities (heat,electricity)? No          2024   Dental   Dentist two times every year? (!) NO          2024   TB Screening   Were you born outside of the [place]? Yes      Today's PHQ-2 Score:       2024     2:51 PM   PHQ-2 (  [employer])   Q1: Little interest or pleasure in doing things 0   Q2: Feeling down, depressed or hopeless 0   PHQ-2 Score 0   Q1: Little interest or pleasure in doing things Not at all   Q2: Feeling down, depressed or hopeless Not at all   PHQ-2 Score 0         2024   Substance Use   Alcohol more than 3/day or more than 7/wk No   Do you use any other substances recreationally? No      Social History     Tobacco Use    Smoking status: Former     Current packs/day: 0.00     Types: Cigarettes     Quit date: 2017     Years since quittin.8    Smokeless tobacco: Never   Vaping Use    Vaping status: Never Used   Substance Use Topics    Alcohol use: Yes     Comment: occasionally    Drug use: No         2024   STI Screening   New sexual partner(s) since last STI/HIV test? No          2024   Contraception/Family Planning   Questions about contraception or family planning No      Reviewed and updated as needed this visit by Provider   Tobacco   Meds  Problems  Med Hx  Surg Hx  Fam Hx  Soc Hx Sexual   Activity        Review of Systems  Constitutional, HEENT, cardiovascular, pulmonary, gi and gu systems are negative, except as otherwise noted.     Objective    Exam  /87 (BP Location: Left arm, Patient Position: Sitting, Cuff Size: Adult Large)   Pulse 79   Temp 98  F (36.7  C) (Temporal)   Resp 12   Ht 1.713 m (5' 7.44\")   Wt 93.9 kg (207 lb)   SpO2 98%   BMI 32.00 kg/m     Estimated body mass index is 32 kg/m  as calculated from the following:    Height as of this encounter: 1.713 m (5' 7.44\").    Weight as of this encounter: 93.9 kg (207 lb).    Physical Exam  GENERAL: alert and no distress  EYES: Eyes grossly normal to inspection, " PERRL and conjunctivae and sclerae normal  HENT: ear canals and TM's normal, nose and mouth without ulcers or lesions  NECK: no adenopathy, no asymmetry, masses, or scars  RESP: lungs clear to auscultation - no rales, rhonchi or wheezes  CV: regular rate and rhythm, normal S1 S2, no S3 or S4, no murmur, click or rub, no peripheral edema  ABDOMEN: soft, nontender, no hepatosplenomegaly, no masses and bowel sounds normal   (male): testicles normal without atrophy or masses, hydrocele present bilateral, no hernias, and penis normal without urethral discharge  MS: no gross musculoskeletal defects noted, no edema  SKIN: 0.5 cm lesion on posterior left thigh, with 2 cm of surrounding erythema and mild induration.  NEURO: Normal strength and tone, mentation intact and speech normal  PSYCH: mentation appears normal, affect normal/bright    Signed Electronically by: NAYELY Bryan CNP

## 2024-09-27 ENCOUNTER — LAB (OUTPATIENT)
Dept: LAB | Facility: CLINIC | Age: 39
End: 2024-09-27
Payer: COMMERCIAL

## 2024-09-27 DIAGNOSIS — Z13.220 LIPID SCREENING: ICD-10-CM

## 2024-09-27 DIAGNOSIS — Z13.1 SCREENING FOR DIABETES MELLITUS: ICD-10-CM

## 2024-09-27 DIAGNOSIS — Z00.00 ROUTINE GENERAL MEDICAL EXAMINATION AT A HEALTH CARE FACILITY: ICD-10-CM

## 2024-09-27 LAB
ALBUMIN SERPL BCG-MCNC: 4.6 G/DL (ref 3.5–5.2)
ALP SERPL-CCNC: 72 U/L (ref 40–150)
ALT SERPL W P-5'-P-CCNC: 39 U/L (ref 0–70)
ANION GAP SERPL CALCULATED.3IONS-SCNC: 11 MMOL/L (ref 7–15)
AST SERPL W P-5'-P-CCNC: 33 U/L (ref 0–45)
BILIRUB SERPL-MCNC: 0.7 MG/DL
BUN SERPL-MCNC: 11.6 MG/DL (ref 6–20)
CALCIUM SERPL-MCNC: 9.4 MG/DL (ref 8.8–10.4)
CHLORIDE SERPL-SCNC: 105 MMOL/L (ref 98–107)
CHOLEST SERPL-MCNC: 242 MG/DL
CREAT SERPL-MCNC: 0.81 MG/DL (ref 0.67–1.17)
EGFRCR SERPLBLD CKD-EPI 2021: >90 ML/MIN/1.73M2
EST. AVERAGE GLUCOSE BLD GHB EST-MCNC: 108 MG/DL
FASTING STATUS PATIENT QL REPORTED: ABNORMAL
FASTING STATUS PATIENT QL REPORTED: NORMAL
GLUCOSE SERPL-MCNC: 97 MG/DL (ref 70–99)
HBA1C MFR BLD: 5.4 % (ref 0–5.6)
HCO3 SERPL-SCNC: 22 MMOL/L (ref 22–29)
HDLC SERPL-MCNC: 42 MG/DL
LDLC SERPL CALC-MCNC: 126 MG/DL
NONHDLC SERPL-MCNC: 200 MG/DL
POTASSIUM SERPL-SCNC: 4.2 MMOL/L (ref 3.4–5.3)
PROT SERPL-MCNC: 7.5 G/DL (ref 6.4–8.3)
SODIUM SERPL-SCNC: 138 MMOL/L (ref 135–145)
TRIGL SERPL-MCNC: 369 MG/DL

## 2024-09-27 PROCEDURE — 36415 COLL VENOUS BLD VENIPUNCTURE: CPT

## 2024-09-27 PROCEDURE — 80061 LIPID PANEL: CPT

## 2024-09-27 PROCEDURE — 83036 HEMOGLOBIN GLYCOSYLATED A1C: CPT

## 2024-09-27 PROCEDURE — 80053 COMPREHEN METABOLIC PANEL: CPT

## 2024-11-12 ENCOUNTER — APPOINTMENT (OUTPATIENT)
Dept: INTERPRETER SERVICES | Facility: CLINIC | Age: 39
End: 2024-11-12
Payer: COMMERCIAL

## 2025-07-07 ENCOUNTER — APPOINTMENT (OUTPATIENT)
Dept: INTERPRETER SERVICES | Facility: CLINIC | Age: 40
End: 2025-07-07

## 2025-07-22 ENCOUNTER — OFFICE VISIT (OUTPATIENT)
Dept: FAMILY MEDICINE | Facility: CLINIC | Age: 40
End: 2025-07-22
Payer: COMMERCIAL

## 2025-07-22 VITALS
HEART RATE: 72 BPM | SYSTOLIC BLOOD PRESSURE: 141 MMHG | BODY MASS INDEX: 30.45 KG/M2 | OXYGEN SATURATION: 98 % | TEMPERATURE: 97.5 F | WEIGHT: 197 LBS | RESPIRATION RATE: 16 BRPM | DIASTOLIC BLOOD PRESSURE: 90 MMHG

## 2025-07-22 DIAGNOSIS — L50.9 WELT: Primary | ICD-10-CM

## 2025-07-22 DIAGNOSIS — L08.9 LOCAL INFECTION OF SKIN AND SUBCUTANEOUS TISSUE: ICD-10-CM

## 2025-07-22 PROCEDURE — 3077F SYST BP >= 140 MM HG: CPT | Performed by: NURSE PRACTITIONER

## 2025-07-22 PROCEDURE — 99214 OFFICE O/P EST MOD 30 MIN: CPT | Performed by: NURSE PRACTITIONER

## 2025-07-22 PROCEDURE — 3079F DIAST BP 80-89 MM HG: CPT | Performed by: NURSE PRACTITIONER

## 2025-07-22 RX ORDER — CETIRIZINE HYDROCHLORIDE 10 MG/1
10 TABLET ORAL DAILY PRN
Qty: 90 TABLET | Refills: 3 | Status: SHIPPED | OUTPATIENT
Start: 2025-07-22

## 2025-07-22 RX ORDER — MUPIROCIN 2 %
OINTMENT (GRAM) TOPICAL 3 TIMES DAILY PRN
Qty: 15 G | Refills: 0 | Status: SHIPPED | OUTPATIENT
Start: 2025-07-22

## 2025-07-22 RX ORDER — CETIRIZINE HYDROCHLORIDE 10 MG/1
10 TABLET ORAL DAILY
Qty: 90 TABLET | Refills: 3 | Status: SHIPPED | OUTPATIENT
Start: 2025-07-22 | End: 2025-07-22

## 2025-07-22 RX ORDER — MUPIROCIN 2 %
OINTMENT (GRAM) TOPICAL 3 TIMES DAILY PRN
Qty: 15 G | Refills: 0 | Status: SHIPPED | OUTPATIENT
Start: 2025-07-22 | End: 2025-07-22

## 2025-07-22 NOTE — PROGRESS NOTES
"  Assessment & Plan     Welt  Reports welts with mosquito bites.  Discussed using insect repellent when working outside, cool long sleeves/pants as tolerated.  - REVIEW OF HEALTH MAINTENANCE PROTOCOL ORDERS  - cetirizine (ZYRTEC) 10 MG tablet; Take 1 tablet (10 mg) by mouth daily as needed for allergies.  - diphenhydrAMINE (BENADRYL) 2 % external cream; Apply topically 3 times daily as needed for itching or irritation.    Local infection of skin and subcutaneous tissue  No signs of cellulitis, a few open areas from repeated scratching  - mupirocin (BACTROBAN) 2 % external ointment; Apply topically 3 times daily as needed (skin infection).    BMI  Estimated body mass index is 30.45 kg/m  as calculated from the following:    Height as of 9/26/24: 1.713 m (5' 7.44\").    Weight as of this encounter: 89.4 kg (197 lb).           Silvia Meehan is a 39 year old, presenting for the following health issues:  Dizziness and Insect Bites      7/22/2025     9:43 AM   Additional Questions   Roomed by tavon     History of Present Illness       Reason for visit:  Head  Symptom onset:  3-4 weeks ago  Symptom intensity:  Mild  Symptom progression:  Staying the same  Had these symptoms before:  No   He is taking medications regularly.                  Review of Systems  Constitutional, HEENT, cardiovascular, pulmonary, gi and gu systems are negative, except as otherwise noted.      Objective    BP (!) 141/90   Pulse 72   Temp 97.5  F (36.4  C) (Temporal)   Resp 16   Wt 89.4 kg (197 lb)   SpO2 98%   BMI 30.45 kg/m    Body mass index is 30.45 kg/m .  Physical Exam   GENERAL: alert and no distress  RESP: lungs clear to auscultation - no rales, rhonchi or wheezes  SKIN: several insect bites to LE and UE, a few open areas to L leg            Signed Electronically by: NAYELY Angelo CNP    "

## 2025-07-22 NOTE — PATIENT INSTRUCTIONS
At [facility], we strive to deliver an exceptional experience to you, every time we see you. If you receive a survey, please let us know what we are doing well and/or what we could improve upon, as we do value your feedback.  If you have MyChart, you can expect to receive results automatically within 24 hours of their completion.  Your provider will send a note interpreting your results as well.   If you do not have MyChart, you should receive your results in about a week by mail.    Your care team:                            Family Medicine Internal Medicine   MD Landon Flanagan, MD Mima Hernandez, MD Yoni Nowak, MD Cecelia Olivares, PA-C NAYELY Angelo, KENDRA Mclean, MD Pediatrics   MD Joycelyn Blood, MD Tanja Boyce, PABETSY Dye APRN CNP   MD Mary Marie, MD Jessica Loaiza, CNP      Same-Day Provider (No follow-up visits)    KANU Baldwin PA-C     Clinic hours: Monday - Thursday 7 am-6 pm; Fridays 7 am-5 pm.   Urgent care: Monday - Friday 10 am- 8 pm; Saturday and Sunday 9 am-5 pm.    Clinic: [phone]       [facility]: Monday - Thursday 8 am - 7 pm; Friday 8 am - 6 pm  [facility]: [phone]     [facility] Scheduling: Monday - Friday 7 am - 7 pm; Saturday 7 am - 3:30 pm  [phone] [place] : [phone]

## 2025-08-27 ENCOUNTER — PATIENT OUTREACH (OUTPATIENT)
Dept: CARE COORDINATION | Facility: CLINIC | Age: 40
End: 2025-08-27
Payer: COMMERCIAL

## (undated) DEVICE — GOWN XLG DISP 9545

## (undated) DEVICE — NDL SPINAL 25GA 3.5" QUINCKE 405180

## (undated) DEVICE — SOL WATER IRRIG 1000ML BOTTLE 07139-09

## (undated) DEVICE — ESU ELEC BLADE 2.75" COATED/INSULATED E1455

## (undated) DEVICE — GLOVE PROTEXIS W/NEU-THERA 7.5  2D73TE75

## (undated) DEVICE — DRAPE SPLIT EENT 76X124" 3X28" 9447

## (undated) DEVICE — DRAPE SHEET HALF 40X60" 9358

## (undated) DEVICE — ESU GROUND PAD ADULT W/CORD E7507

## (undated) DEVICE — SYR 10ML FINGER CONTROL W/O NDL 309695

## (undated) DEVICE — NDL DENTAL 27GA LONG 8881400058

## (undated) DEVICE — SYR EAR BULB 3OZ 0035830

## (undated) DEVICE — SU PROLENE 2-0 FS 18" 8685H

## (undated) DEVICE — SUCTION CANISTER MEDIVAC LINER 1500ML W/LID 65651-515

## (undated) DEVICE — MARKER SKIN DOUBLE TIP W/FLEXI-RULER W/LABELS

## (undated) DEVICE — SU CHROMIC 4-0 P-3 18" 1654G

## (undated) DEVICE — SPECIMEN CONTAINER 4OZ

## (undated) DEVICE — PACK SET-UP STD 9102

## (undated) DEVICE — SUCTION TIP YANKAUER W/O VENT K86

## (undated) DEVICE — ESU PENCIL W/HOLSTER

## (undated) DEVICE — PACK MINOR SBA15MIFSE

## (undated) DEVICE — NDL 25GA 1.5" 305127

## (undated) DEVICE — ESU SUCTION CAUTERY 10FR FOOT CONTROL E2505-10FR

## (undated) DEVICE — NDL 19GA 1.5"

## (undated) DEVICE — SPONGE COTTONOID 1/2X3" 80-1407

## (undated) DEVICE — BASIN SET MINOR DISP

## (undated) DEVICE — TUBING SUCTION 10'X3/16" N510

## (undated) RX ORDER — DEXMEDETOMIDINE HYDROCHLORIDE 4 UG/ML
INJECTION, SOLUTION INTRAVENOUS
Status: DISPENSED
Start: 2018-03-27

## (undated) RX ORDER — FENTANYL CITRATE 50 UG/ML
INJECTION, SOLUTION INTRAMUSCULAR; INTRAVENOUS
Status: DISPENSED
Start: 2018-03-27

## (undated) RX ORDER — LIDOCAINE HYDROCHLORIDE 10 MG/ML
INJECTION, SOLUTION EPIDURAL; INFILTRATION; INTRACAUDAL; PERINEURAL
Status: DISPENSED
Start: 2018-03-27

## (undated) RX ORDER — GABAPENTIN 300 MG/1
CAPSULE ORAL
Status: DISPENSED
Start: 2018-03-27

## (undated) RX ORDER — PROPOFOL 10 MG/ML
INJECTION, EMULSION INTRAVENOUS
Status: DISPENSED
Start: 2018-03-27

## (undated) RX ORDER — LIDOCAINE HYDROCHLORIDE AND EPINEPHRINE BITARTRATE 20; .01 MG/ML; MG/ML
INJECTION, SOLUTION SUBCUTANEOUS
Status: DISPENSED
Start: 2018-03-27

## (undated) RX ORDER — LIDOCAINE HYDROCHLORIDE 20 MG/ML
INJECTION, SOLUTION EPIDURAL; INFILTRATION; INTRACAUDAL; PERINEURAL
Status: DISPENSED
Start: 2018-03-27

## (undated) RX ORDER — ONDANSETRON 2 MG/ML
INJECTION INTRAMUSCULAR; INTRAVENOUS
Status: DISPENSED
Start: 2018-03-27

## (undated) RX ORDER — DEXAMETHASONE SODIUM PHOSPHATE 4 MG/ML
INJECTION, SOLUTION INTRA-ARTICULAR; INTRALESIONAL; INTRAMUSCULAR; INTRAVENOUS; SOFT TISSUE
Status: DISPENSED
Start: 2018-03-27

## (undated) RX ORDER — LIDOCAINE HYDROCHLORIDE 40 MG/ML
SOLUTION TOPICAL
Status: DISPENSED
Start: 2018-03-27

## (undated) RX ORDER — KETOROLAC TROMETHAMINE 30 MG/ML
INJECTION, SOLUTION INTRAMUSCULAR; INTRAVENOUS
Status: DISPENSED
Start: 2018-03-27

## (undated) RX ORDER — CEFAZOLIN SODIUM 2 G/100ML
INJECTION, SOLUTION INTRAVENOUS
Status: DISPENSED
Start: 2018-03-27

## (undated) RX ORDER — ACETAMINOPHEN 325 MG/1
TABLET ORAL
Status: DISPENSED
Start: 2018-03-27